# Patient Record
Sex: MALE | Race: WHITE | Employment: FULL TIME | URBAN - METROPOLITAN AREA
[De-identification: names, ages, dates, MRNs, and addresses within clinical notes are randomized per-mention and may not be internally consistent; named-entity substitution may affect disease eponyms.]

---

## 2020-03-04 ENCOUNTER — APPOINTMENT (OUTPATIENT)
Dept: ULTRASOUND IMAGING | Facility: CLINIC | Age: 60
End: 2020-03-04
Attending: INTERNAL MEDICINE
Payer: COMMERCIAL

## 2020-03-04 ENCOUNTER — APPOINTMENT (OUTPATIENT)
Dept: INTERVENTIONAL RADIOLOGY/VASCULAR | Facility: CLINIC | Age: 60
End: 2020-03-04
Attending: NURSE PRACTITIONER
Payer: COMMERCIAL

## 2020-03-04 ENCOUNTER — HOSPITAL ENCOUNTER (INPATIENT)
Facility: CLINIC | Age: 60
LOS: 2 days | Discharge: HOME | End: 2020-03-06
Attending: INTERNAL MEDICINE | Admitting: INTERNAL MEDICINE
Payer: COMMERCIAL

## 2020-03-04 ENCOUNTER — APPOINTMENT (OUTPATIENT)
Dept: CT IMAGING | Facility: CLINIC | Age: 60
End: 2020-03-04
Attending: EMERGENCY MEDICINE
Payer: COMMERCIAL

## 2020-03-04 ENCOUNTER — HOSPITAL ENCOUNTER (EMERGENCY)
Facility: CLINIC | Age: 60
Discharge: ANOTHER HOSPITAL | End: 2020-03-04
Attending: EMERGENCY MEDICINE | Admitting: EMERGENCY MEDICINE
Payer: COMMERCIAL

## 2020-03-04 VITALS
WEIGHT: 225 LBS | TEMPERATURE: 97.5 F | HEART RATE: 82 BPM | HEIGHT: 71 IN | DIASTOLIC BLOOD PRESSURE: 82 MMHG | BODY MASS INDEX: 31.5 KG/M2 | OXYGEN SATURATION: 94 % | RESPIRATION RATE: 24 BRPM | SYSTOLIC BLOOD PRESSURE: 129 MMHG

## 2020-03-04 DIAGNOSIS — I26.02 ACUTE SADDLE PULMONARY EMBOLISM WITH ACUTE COR PULMONALE (H): ICD-10-CM

## 2020-03-04 DIAGNOSIS — I26.02 ACUTE SADDLE PULMONARY EMBOLISM WITH ACUTE COR PULMONALE (H): Primary | ICD-10-CM

## 2020-03-04 PROBLEM — I26.99 PULMONARY EMBOLISM (H): Status: ACTIVE | Noted: 2020-03-04

## 2020-03-04 LAB
ANION GAP SERPL CALCULATED.3IONS-SCNC: 6 MMOL/L (ref 3–14)
BUN SERPL-MCNC: 15 MG/DL (ref 7–30)
CALCIUM SERPL-MCNC: 8.6 MG/DL (ref 8.5–10.1)
CHLORIDE SERPL-SCNC: 106 MMOL/L (ref 94–109)
CO2 SERPL-SCNC: 25 MMOL/L (ref 20–32)
CREAT SERPL-MCNC: 0.95 MG/DL (ref 0.66–1.25)
D DIMER PPP FEU-MCNC: 5 UG/ML FEU (ref 0–0.5)
ERYTHROCYTE [DISTWIDTH] IN BLOOD BY AUTOMATED COUNT: 12.9 % (ref 10–15)
ERYTHROCYTE [DISTWIDTH] IN BLOOD BY AUTOMATED COUNT: 13 % (ref 10–15)
GFR SERPL CREATININE-BSD FRML MDRD: 86 ML/MIN/{1.73_M2}
GLUCOSE BLDC GLUCOMTR-MCNC: 128 MG/DL (ref 70–99)
GLUCOSE BLDC GLUCOMTR-MCNC: 153 MG/DL (ref 70–99)
GLUCOSE BLDC GLUCOMTR-MCNC: 176 MG/DL (ref 70–99)
GLUCOSE BLDC GLUCOMTR-MCNC: 178 MG/DL (ref 70–99)
GLUCOSE SERPL-MCNC: 262 MG/DL (ref 70–99)
HBA1C MFR BLD: 8.9 % (ref 0–5.6)
HCT VFR BLD AUTO: 41 % (ref 40–53)
HCT VFR BLD AUTO: 44.1 % (ref 40–53)
HGB BLD-MCNC: 14 G/DL (ref 13.3–17.7)
HGB BLD-MCNC: 14.7 G/DL (ref 13.3–17.7)
INR PPP: 1.17 (ref 0.86–1.14)
INTERPRETATION ECG - MUSE: NORMAL
INTERPRETATION ECG - MUSE: NORMAL
KCT BLD-ACNC: 247 SEC (ref 75–150)
LMWH PPP CHRO-ACNC: 1 IU/ML
MCH RBC QN AUTO: 29.4 PG (ref 26.5–33)
MCH RBC QN AUTO: 29.5 PG (ref 26.5–33)
MCHC RBC AUTO-ENTMCNC: 33.3 G/DL (ref 31.5–36.5)
MCHC RBC AUTO-ENTMCNC: 34.1 G/DL (ref 31.5–36.5)
MCV RBC AUTO: 87 FL (ref 78–100)
MCV RBC AUTO: 88 FL (ref 78–100)
MRSA DNA SPEC QL NAA+PROBE: NEGATIVE
NT-PROBNP SERPL-MCNC: 595 PG/ML (ref 0–900)
PLATELET # BLD AUTO: 63 10E9/L (ref 150–450)
PLATELET # BLD AUTO: 67 10E9/L (ref 150–450)
POTASSIUM SERPL-SCNC: 3.8 MMOL/L (ref 3.4–5.3)
RADIOLOGIST FLAGS: ABNORMAL
RBC # BLD AUTO: 4.74 10E12/L (ref 4.4–5.9)
RBC # BLD AUTO: 5 10E12/L (ref 4.4–5.9)
SODIUM SERPL-SCNC: 136 MMOL/L (ref 133–144)
SPECIMEN SOURCE: NORMAL
TROPONIN I SERPL-MCNC: 0.12 UG/L (ref 0–0.04)
TROPONIN I SERPL-MCNC: 0.13 UG/L (ref 0–0.04)
WBC # BLD AUTO: 8.4 10E9/L (ref 4–11)
WBC # BLD AUTO: 9.9 10E9/L (ref 4–11)

## 2020-03-04 PROCEDURE — 99223 1ST HOSP IP/OBS HIGH 75: CPT | Performed by: INTERNAL MEDICINE

## 2020-03-04 PROCEDURE — 25500064 ZZH RX 255 OP 636: Performed by: RADIOLOGY

## 2020-03-04 PROCEDURE — 25000131 ZZH RX MED GY IP 250 OP 636 PS 637: Performed by: NURSE PRACTITIONER

## 2020-03-04 PROCEDURE — 27210742 ZZH CATH CR1

## 2020-03-04 PROCEDURE — C1769 GUIDE WIRE: HCPCS

## 2020-03-04 PROCEDURE — 71260 CT THORAX DX C+: CPT

## 2020-03-04 PROCEDURE — 83036 HEMOGLOBIN GLYCOSYLATED A1C: CPT | Performed by: NURSE PRACTITIONER

## 2020-03-04 PROCEDURE — 27210808 ZZH SHEATH CR7

## 2020-03-04 PROCEDURE — 83880 ASSAY OF NATRIURETIC PEPTIDE: CPT | Performed by: INTERNAL MEDICINE

## 2020-03-04 PROCEDURE — 85379 FIBRIN DEGRADATION QUANT: CPT | Performed by: RADIOLOGY

## 2020-03-04 PROCEDURE — 25000128 H RX IP 250 OP 636: Performed by: EMERGENCY MEDICINE

## 2020-03-04 PROCEDURE — 27210740 ZZH ACCESSORY CR3

## 2020-03-04 PROCEDURE — 96365 THER/PROPH/DIAG IV INF INIT: CPT

## 2020-03-04 PROCEDURE — 99223 1ST HOSP IP/OBS HIGH 75: CPT | Mod: AI | Performed by: NURSE PRACTITIONER

## 2020-03-04 PROCEDURE — 20000003 ZZH R&B ICU

## 2020-03-04 PROCEDURE — 25000128 H RX IP 250 OP 636: Performed by: INTERNAL MEDICINE

## 2020-03-04 PROCEDURE — 96375 TX/PRO/DX INJ NEW DRUG ADDON: CPT

## 2020-03-04 PROCEDURE — 36015 PLACE CATHETER IN ARTERY: CPT

## 2020-03-04 PROCEDURE — 00000146 ZZHCL STATISTIC GLUCOSE BY METER IP

## 2020-03-04 PROCEDURE — 25000125 ZZHC RX 250: Performed by: PHYSICIAN ASSISTANT

## 2020-03-04 PROCEDURE — 87640 STAPH A DNA AMP PROBE: CPT | Performed by: NURSE PRACTITIONER

## 2020-03-04 PROCEDURE — 85260 CLOT FACTOR X STUART-POWER: CPT | Performed by: INTERNAL MEDICINE

## 2020-03-04 PROCEDURE — 93005 ELECTROCARDIOGRAM TRACING: CPT | Mod: 76

## 2020-03-04 PROCEDURE — 85520 HEPARIN ASSAY: CPT | Performed by: INTERNAL MEDICINE

## 2020-03-04 PROCEDURE — 93971 EXTREMITY STUDY: CPT

## 2020-03-04 PROCEDURE — 25800030 ZZH RX IP 258 OP 636: Performed by: RADIOLOGY

## 2020-03-04 PROCEDURE — 27210804 ZZH SHEATH CR3

## 2020-03-04 PROCEDURE — 02CQ3ZZ EXTIRPATION OF MATTER FROM RIGHT PULMONARY ARTERY, PERCUTANEOUS APPROACH: ICD-10-PCS | Performed by: RADIOLOGY

## 2020-03-04 PROCEDURE — 99207 ZZC CDG-CRITICAL CARE TIME NOT DOCUMENTED: CPT | Performed by: NURSE PRACTITIONER

## 2020-03-04 PROCEDURE — 80048 BASIC METABOLIC PNL TOTAL CA: CPT | Performed by: EMERGENCY MEDICINE

## 2020-03-04 PROCEDURE — 36415 COLL VENOUS BLD VENIPUNCTURE: CPT | Performed by: RADIOLOGY

## 2020-03-04 PROCEDURE — 27211192 ZZ H SHEATH CR14

## 2020-03-04 PROCEDURE — 85347 COAGULATION TIME ACTIVATED: CPT

## 2020-03-04 PROCEDURE — 36415 COLL VENOUS BLD VENIPUNCTURE: CPT | Performed by: INTERNAL MEDICINE

## 2020-03-04 PROCEDURE — 27210806 ZZH SHEATH CR5

## 2020-03-04 PROCEDURE — 99285 EMERGENCY DEPT VISIT HI MDM: CPT | Mod: 25

## 2020-03-04 PROCEDURE — 36014 PLACE CATHETER IN ARTERY: CPT

## 2020-03-04 PROCEDURE — 85027 COMPLETE CBC AUTOMATED: CPT | Performed by: NURSE PRACTITIONER

## 2020-03-04 PROCEDURE — 25000128 H RX IP 250 OP 636: Performed by: PHYSICIAN ASSISTANT

## 2020-03-04 PROCEDURE — C1757 CATH, THROMBECTOMY/EMBOLECT: HCPCS

## 2020-03-04 PROCEDURE — 87641 MR-STAPH DNA AMP PROBE: CPT | Performed by: NURSE PRACTITIONER

## 2020-03-04 PROCEDURE — 02CR3ZZ EXTIRPATION OF MATTER FROM LEFT PULMONARY ARTERY, PERCUTANEOUS APPROACH: ICD-10-PCS | Performed by: RADIOLOGY

## 2020-03-04 PROCEDURE — 27210886 ZZH ACCESSORY CR5

## 2020-03-04 PROCEDURE — 25000128 H RX IP 250 OP 636: Performed by: RADIOLOGY

## 2020-03-04 PROCEDURE — 85610 PROTHROMBIN TIME: CPT | Performed by: RADIOLOGY

## 2020-03-04 PROCEDURE — 96361 HYDRATE IV INFUSION ADD-ON: CPT | Mod: 59

## 2020-03-04 PROCEDURE — 93005 ELECTROCARDIOGRAM TRACING: CPT

## 2020-03-04 PROCEDURE — 84484 ASSAY OF TROPONIN QUANT: CPT | Performed by: EMERGENCY MEDICINE

## 2020-03-04 PROCEDURE — 25800030 ZZH RX IP 258 OP 636: Performed by: EMERGENCY MEDICINE

## 2020-03-04 PROCEDURE — 85027 COMPLETE CBC AUTOMATED: CPT | Performed by: EMERGENCY MEDICINE

## 2020-03-04 PROCEDURE — 36415 COLL VENOUS BLD VENIPUNCTURE: CPT | Performed by: NURSE PRACTITIONER

## 2020-03-04 RX ORDER — NICOTINE POLACRILEX 4 MG
15-30 LOZENGE BUCCAL
Status: DISCONTINUED | OUTPATIENT
Start: 2020-03-04 | End: 2020-03-06 | Stop reason: HOSPADM

## 2020-03-04 RX ORDER — LIDOCAINE HYDROCHLORIDE 10 MG/ML
INJECTION, SOLUTION INFILTRATION; PERINEURAL
Status: DISCONTINUED
Start: 2020-03-04 | End: 2020-03-04 | Stop reason: HOSPADM

## 2020-03-04 RX ORDER — ESCITALOPRAM OXALATE 10 MG/1
10 TABLET ORAL DAILY
Status: ON HOLD | COMMUNITY
End: 2020-03-04

## 2020-03-04 RX ORDER — POTASSIUM CHLORIDE 29.8 MG/ML
20 INJECTION INTRAVENOUS
Status: DISCONTINUED | OUTPATIENT
Start: 2020-03-04 | End: 2020-03-06 | Stop reason: HOSPADM

## 2020-03-04 RX ORDER — PROCHLORPERAZINE MALEATE 5 MG
10 TABLET ORAL EVERY 6 HOURS PRN
Status: DISCONTINUED | OUTPATIENT
Start: 2020-03-04 | End: 2020-03-06 | Stop reason: HOSPADM

## 2020-03-04 RX ORDER — ATORVASTATIN CALCIUM 20 MG/1
20 TABLET, FILM COATED ORAL DAILY
Status: ON HOLD | COMMUNITY
End: 2020-03-04

## 2020-03-04 RX ORDER — GLIPIZIDE 10 MG/1
10 TABLET, FILM COATED, EXTENDED RELEASE ORAL DAILY
COMMUNITY

## 2020-03-04 RX ORDER — ACETAMINOPHEN 500 MG
500 TABLET ORAL EVERY 6 HOURS PRN
Status: DISCONTINUED | OUTPATIENT
Start: 2020-03-04 | End: 2020-03-06 | Stop reason: HOSPADM

## 2020-03-04 RX ORDER — NALOXONE HYDROCHLORIDE 0.4 MG/ML
.1-.4 INJECTION, SOLUTION INTRAMUSCULAR; INTRAVENOUS; SUBCUTANEOUS
Status: DISCONTINUED | OUTPATIENT
Start: 2020-03-04 | End: 2020-03-04 | Stop reason: HOSPADM

## 2020-03-04 RX ORDER — MAGNESIUM SULFATE HEPTAHYDRATE 40 MG/ML
2 INJECTION, SOLUTION INTRAVENOUS DAILY PRN
Status: DISCONTINUED | OUTPATIENT
Start: 2020-03-04 | End: 2020-03-06 | Stop reason: HOSPADM

## 2020-03-04 RX ORDER — POTASSIUM CL/LIDO/0.9 % NACL 10MEQ/0.1L
10 INTRAVENOUS SOLUTION, PIGGYBACK (ML) INTRAVENOUS
Status: DISCONTINUED | OUTPATIENT
Start: 2020-03-04 | End: 2020-03-06 | Stop reason: HOSPADM

## 2020-03-04 RX ORDER — ASPIRIN 81 MG/1
81 TABLET ORAL DAILY
Status: ON HOLD | COMMUNITY
End: 2020-03-06

## 2020-03-04 RX ORDER — FENTANYL CITRATE 50 UG/ML
25-50 INJECTION, SOLUTION INTRAMUSCULAR; INTRAVENOUS EVERY 5 MIN PRN
Status: DISCONTINUED | OUTPATIENT
Start: 2020-03-04 | End: 2020-03-04 | Stop reason: HOSPADM

## 2020-03-04 RX ORDER — HEPARIN SODIUM 1000 [USP'U]/ML
500-6000 INJECTION, SOLUTION INTRAVENOUS; SUBCUTANEOUS
Status: DISCONTINUED | OUTPATIENT
Start: 2020-03-04 | End: 2020-03-04 | Stop reason: HOSPADM

## 2020-03-04 RX ORDER — IOPAMIDOL 755 MG/ML
80 INJECTION, SOLUTION INTRAVASCULAR ONCE
Status: COMPLETED | OUTPATIENT
Start: 2020-03-04 | End: 2020-03-04

## 2020-03-04 RX ORDER — CODEINE PHOSPHATE AND GUAIFENESIN 10; 100 MG/5ML; MG/5ML
1 SOLUTION ORAL EVERY 4 HOURS PRN
Status: ON HOLD | COMMUNITY
End: 2020-03-04

## 2020-03-04 RX ORDER — POLYETHYLENE GLYCOL 3350 17 G/17G
17 POWDER, FOR SOLUTION ORAL DAILY PRN
Status: DISCONTINUED | OUTPATIENT
Start: 2020-03-04 | End: 2020-03-06 | Stop reason: HOSPADM

## 2020-03-04 RX ORDER — ALBUTEROL SULFATE 0.83 MG/ML
2.5 SOLUTION RESPIRATORY (INHALATION) EVERY 6 HOURS PRN
COMMUNITY

## 2020-03-04 RX ORDER — BENZONATATE 150 MG/1
200 CAPSULE ORAL 3 TIMES DAILY PRN
Status: ON HOLD | COMMUNITY
End: 2020-03-04

## 2020-03-04 RX ORDER — NALOXONE HYDROCHLORIDE 0.4 MG/ML
.1-.4 INJECTION, SOLUTION INTRAMUSCULAR; INTRAVENOUS; SUBCUTANEOUS
Status: DISCONTINUED | OUTPATIENT
Start: 2020-03-04 | End: 2020-03-06 | Stop reason: HOSPADM

## 2020-03-04 RX ORDER — FENTANYL CITRATE 50 UG/ML
INJECTION, SOLUTION INTRAMUSCULAR; INTRAVENOUS
Status: DISCONTINUED
Start: 2020-03-04 | End: 2020-03-04 | Stop reason: HOSPADM

## 2020-03-04 RX ORDER — METFORMIN HCL 500 MG
1000 TABLET, EXTENDED RELEASE 24 HR ORAL
COMMUNITY

## 2020-03-04 RX ORDER — DEXTROSE MONOHYDRATE 25 G/50ML
25-50 INJECTION, SOLUTION INTRAVENOUS
Status: DISCONTINUED | OUTPATIENT
Start: 2020-03-04 | End: 2020-03-04

## 2020-03-04 RX ORDER — FLUMAZENIL 0.1 MG/ML
0.2 INJECTION, SOLUTION INTRAVENOUS
Status: DISCONTINUED | OUTPATIENT
Start: 2020-03-04 | End: 2020-03-04 | Stop reason: HOSPADM

## 2020-03-04 RX ORDER — NICOTINE POLACRILEX 4 MG
15-30 LOZENGE BUCCAL
Status: DISCONTINUED | OUTPATIENT
Start: 2020-03-04 | End: 2020-03-04

## 2020-03-04 RX ORDER — POTASSIUM CHLORIDE 7.45 MG/ML
10 INJECTION INTRAVENOUS
Status: DISCONTINUED | OUTPATIENT
Start: 2020-03-04 | End: 2020-03-06 | Stop reason: HOSPADM

## 2020-03-04 RX ORDER — HEPARIN SODIUM 10000 [USP'U]/100ML
1550 INJECTION, SOLUTION INTRAVENOUS CONTINUOUS
Status: DISCONTINUED | OUTPATIENT
Start: 2020-03-04 | End: 2020-03-04 | Stop reason: HOSPADM

## 2020-03-04 RX ORDER — ONDANSETRON 2 MG/ML
4 INJECTION INTRAMUSCULAR; INTRAVENOUS EVERY 6 HOURS PRN
Status: DISCONTINUED | OUTPATIENT
Start: 2020-03-04 | End: 2020-03-06 | Stop reason: HOSPADM

## 2020-03-04 RX ORDER — POTASSIUM CHLORIDE 1.5 G/1.58G
20-40 POWDER, FOR SOLUTION ORAL
Status: DISCONTINUED | OUTPATIENT
Start: 2020-03-04 | End: 2020-03-06 | Stop reason: HOSPADM

## 2020-03-04 RX ORDER — MAGNESIUM SULFATE HEPTAHYDRATE 40 MG/ML
4 INJECTION, SOLUTION INTRAVENOUS EVERY 4 HOURS PRN
Status: DISCONTINUED | OUTPATIENT
Start: 2020-03-04 | End: 2020-03-06 | Stop reason: HOSPADM

## 2020-03-04 RX ORDER — SODIUM CHLORIDE 9 MG/ML
INJECTION, SOLUTION INTRAVENOUS CONTINUOUS
Status: DISCONTINUED | OUTPATIENT
Start: 2020-03-04 | End: 2020-03-05

## 2020-03-04 RX ORDER — ONDANSETRON 4 MG/1
4 TABLET, ORALLY DISINTEGRATING ORAL EVERY 6 HOURS PRN
Status: DISCONTINUED | OUTPATIENT
Start: 2020-03-04 | End: 2020-03-06 | Stop reason: HOSPADM

## 2020-03-04 RX ORDER — SODIUM CHLORIDE 9 MG/ML
1000 INJECTION, SOLUTION INTRAVENOUS CONTINUOUS
Status: DISCONTINUED | OUTPATIENT
Start: 2020-03-04 | End: 2020-03-04 | Stop reason: HOSPADM

## 2020-03-04 RX ORDER — LISINOPRIL 10 MG/1
10 TABLET ORAL DAILY
Status: ON HOLD | COMMUNITY
End: 2020-03-04

## 2020-03-04 RX ORDER — DEXTROSE MONOHYDRATE 25 G/50ML
25-50 INJECTION, SOLUTION INTRAVENOUS
Status: DISCONTINUED | OUTPATIENT
Start: 2020-03-04 | End: 2020-03-06 | Stop reason: HOSPADM

## 2020-03-04 RX ORDER — PROCHLORPERAZINE 25 MG
25 SUPPOSITORY, RECTAL RECTAL EVERY 12 HOURS PRN
Status: DISCONTINUED | OUTPATIENT
Start: 2020-03-04 | End: 2020-03-06 | Stop reason: HOSPADM

## 2020-03-04 RX ORDER — HYDROMORPHONE HYDROCHLORIDE 1 MG/ML
0.2 INJECTION, SOLUTION INTRAMUSCULAR; INTRAVENOUS; SUBCUTANEOUS
Status: DISCONTINUED | OUTPATIENT
Start: 2020-03-04 | End: 2020-03-06 | Stop reason: HOSPADM

## 2020-03-04 RX ORDER — IODIXANOL 320 MG/ML
150 INJECTION, SOLUTION INTRAVASCULAR ONCE
Status: COMPLETED | OUTPATIENT
Start: 2020-03-04 | End: 2020-03-04

## 2020-03-04 RX ORDER — HEPARIN SODIUM 10000 [USP'U]/100ML
1450 INJECTION, SOLUTION INTRAVENOUS CONTINUOUS
Status: DISCONTINUED | OUTPATIENT
Start: 2020-03-04 | End: 2020-03-06

## 2020-03-04 RX ORDER — POTASSIUM CHLORIDE 1500 MG/1
20-40 TABLET, EXTENDED RELEASE ORAL
Status: DISCONTINUED | OUTPATIENT
Start: 2020-03-04 | End: 2020-03-06 | Stop reason: HOSPADM

## 2020-03-04 RX ORDER — FLUTICASONE PROPIONATE 50 MCG
1 SPRAY, SUSPENSION (ML) NASAL DAILY PRN
COMMUNITY

## 2020-03-04 RX ADMIN — SODIUM CHLORIDE 1000 ML: 9 INJECTION, SOLUTION INTRAVENOUS at 08:11

## 2020-03-04 RX ADMIN — MIDAZOLAM HYDROCHLORIDE 1 MG: 1 INJECTION, SOLUTION INTRAMUSCULAR; INTRAVENOUS at 14:43

## 2020-03-04 RX ADMIN — Medication 6900 UNITS: at 09:18

## 2020-03-04 RX ADMIN — HEPARIN SODIUM 10000 UNITS: 10000 INJECTION INTRAVENOUS; SUBCUTANEOUS at 14:46

## 2020-03-04 RX ADMIN — FENTANYL CITRATE 50 MCG: 50 INJECTION, SOLUTION INTRAMUSCULAR; INTRAVENOUS at 14:43

## 2020-03-04 RX ADMIN — INSULIN ASPART 1 UNITS: 100 INJECTION, SOLUTION INTRAVENOUS; SUBCUTANEOUS at 12:49

## 2020-03-04 RX ADMIN — FENTANYL CITRATE 25 MCG: 50 INJECTION, SOLUTION INTRAMUSCULAR; INTRAVENOUS at 15:18

## 2020-03-04 RX ADMIN — SODIUM CHLORIDE: 9 INJECTION, SOLUTION INTRAVENOUS at 15:30

## 2020-03-04 RX ADMIN — MIDAZOLAM HYDROCHLORIDE 0.5 MG: 1 INJECTION, SOLUTION INTRAMUSCULAR; INTRAVENOUS at 15:55

## 2020-03-04 RX ADMIN — HEPARIN SODIUM 1550 UNITS/HR: 10000 INJECTION, SOLUTION INTRAVENOUS at 09:17

## 2020-03-04 RX ADMIN — MIDAZOLAM HYDROCHLORIDE 0.5 MG: 1 INJECTION, SOLUTION INTRAMUSCULAR; INTRAVENOUS at 16:07

## 2020-03-04 RX ADMIN — FENTANYL CITRATE 25 MCG: 50 INJECTION, SOLUTION INTRAMUSCULAR; INTRAVENOUS at 15:55

## 2020-03-04 RX ADMIN — FENTANYL CITRATE 50 MCG: 50 INJECTION, SOLUTION INTRAMUSCULAR; INTRAVENOUS at 14:59

## 2020-03-04 RX ADMIN — LIDOCAINE HYDROCHLORIDE 5 ML: 10 INJECTION, SOLUTION INFILTRATION; PERINEURAL at 16:42

## 2020-03-04 RX ADMIN — IODIXANOL 140 ML: 320 INJECTION, SOLUTION INTRAVASCULAR at 16:45

## 2020-03-04 RX ADMIN — MIDAZOLAM HYDROCHLORIDE 1 MG: 1 INJECTION, SOLUTION INTRAMUSCULAR; INTRAVENOUS at 16:24

## 2020-03-04 RX ADMIN — HEPARIN SODIUM 1550 UNITS/HR: 10000 INJECTION, SOLUTION INTRAVENOUS at 19:07

## 2020-03-04 RX ADMIN — Medication 2000 UNITS: at 15:32

## 2020-03-04 RX ADMIN — IOPAMIDOL 80 ML: 755 INJECTION, SOLUTION INTRAVENOUS at 08:20

## 2020-03-04 RX ADMIN — FENTANYL CITRATE 25 MCG: 50 INJECTION, SOLUTION INTRAMUSCULAR; INTRAVENOUS at 16:07

## 2020-03-04 RX ADMIN — MIDAZOLAM HYDROCHLORIDE 1 MG: 1 INJECTION, SOLUTION INTRAMUSCULAR; INTRAVENOUS at 14:59

## 2020-03-04 RX ADMIN — FENTANYL CITRATE 25 MCG: 50 INJECTION, SOLUTION INTRAMUSCULAR; INTRAVENOUS at 16:23

## 2020-03-04 RX ADMIN — MIDAZOLAM HYDROCHLORIDE 0.5 MG: 1 INJECTION, SOLUTION INTRAMUSCULAR; INTRAVENOUS at 15:19

## 2020-03-04 ASSESSMENT — ENCOUNTER SYMPTOMS
DIAPHORESIS: 1
SHORTNESS OF BREATH: 1
ABDOMINAL PAIN: 0
COUGH: 1

## 2020-03-04 ASSESSMENT — MIFFLIN-ST. JEOR: SCORE: 1852.72

## 2020-03-04 ASSESSMENT — ACTIVITIES OF DAILY LIVING (ADL)
ADLS_ACUITY_SCORE: 11
ADLS_ACUITY_SCORE: 11

## 2020-03-04 NOTE — ED PROVIDER NOTES
"  History     Chief Complaint:  Chest Pain      HPI   Joseph Kent is a 59 year old male who presents with his wife for evaluation of chest pain. Patient reports picking up a 250 lbs  around 9pm last night and felt midsternal chest pain immediately. Patient states since then he has been unable to take a deep breath as he instantly begins coughing. His pain is currently at a 2/10 but increases to a 8/10 with exertion in conjunction with shortness of breath and diaphoresis. Patient tried to go to work today but his pain exacerbated, prompting him to present to the ED. He denies taking any pain meds. He denies abdominal pain.    Allergies:  Escitalopram    Medications:    Lisinopril  Atorvastatin  Glipizide  Flonase  Metformin  Albuterol  Benzonatate  Robitussin AC    Past Medical History:    Hyperlipidemia  HTN  GERD  GALI  DM type 2  Obesity  Calculus of kidney  Hyperparathyroidism  CKD  Kidney stones    Past Surgical History:    Vasectomy  Appendectomy  Partial thyroidectomy    Family History:    History reviewed. No pertinent family history.     Social History:  The patient presents to the emergency department with his wife  Marital Status:      Review of Systems   Constitutional: Positive for diaphoresis.   Respiratory: Positive for cough and shortness of breath.    Cardiovascular: Positive for chest pain.   Gastrointestinal: Negative for abdominal pain.   All other systems reviewed and are negative.    Physical Exam     Patient Vitals for the past 24 hrs:   BP Temp Temp src Pulse Heart Rate Resp SpO2 Height Weight   03/04/20 0907 -- -- -- -- -- -- -- -- 102.1 kg (225 lb)   03/04/20 0845 125/82 -- -- -- -- -- -- -- --   03/04/20 0815 -- -- -- -- 86 14 94 % -- --   03/04/20 0800 115/81 -- -- 89 89 20 95 % -- --   03/04/20 0745 135/84 -- -- 89 87 27 94 % -- --   03/04/20 0730 104/78 -- -- 89 90 17 93 % -- --   03/04/20 0719 (!) 140/82 97.5  F (36.4  C) Oral 92 -- 14 90 % 1.803 m (5' 11\") " --       Physical Exam  Constitutional: Patient is well appearing. No distress. Head: Atraumatic.  Mouth/Throat: Oropharynx is clear and moist. No oropharyngeal exudate.  Eyes: Conjunctivae and EOM are normal. No scleral icterus.  Neck: Normal range of motion. Neck supple.   Cardiovascular: Normal rate, regular rhythm, normal heart sounds and intact distal pulses.   Pulmonary/Chest: Breath sounds normal. No respiratory distress. Localizes pain to left border of sternum at about the pec level.  Abdominal: Soft. Bowel sounds are normal. No distension. No tenderness. No rebound or guarding.   Musculoskeletal: Normal range of motion. No edema or tenderness. Neurological: Alert and orientated to person, place, and time. No observable focal neuro deficit  Skin: Warm and dry. No rash noted. Not diaphoretic.     Emergency Department Course   ECG (7:17:42):  Rate 92 bpm. AR interval 172. QRS duration 84. QT/QTc 334/413. P-R-T axes 59 47 62. Normal sinus rhythm. Nonspecific ST and T wave abnormality. Abnormal ECG. Interpreted at 730 by Reilly Crocker MD.    ECG (9:17:11):  Rate 83 bpm. AR interval 178. QRS duration 78. QT/QTc 400/470. P-R-T axes 64 42 39. Normal sinus rhythm. Low voltage QRS. Borderline ECG. Interpreted at 920 by Reilly Crocker MD.    Imaging:  Radiographic findings were communicated with the patient who voiced understanding of the findings.  CT Chest with contrast:   IMPRESSION:   1.  Saddle pulmonary embolism with embolic burden crossing the midline  and leading to all lobes of both lungs. There is also evidence for  right heart strain with flattening and mild bowing of the  interventricular septum. Recommend close clinical assessment.  2.  No acute airspace disease.  3.  A few indeterminate pulmonary nodules, see below for follow up.  4.  Indeterminate enhancing nodule at the posterior right liver.  Recommend nonurgent liver MRI for further assessment. as per radiology.    Laboratory:  CBC: WBC:  8.4, HGB: 14.7, PLT: 63 (L)  BMP: Glucose 262 (H), o/w WNL (Creatinine: 0.95)  726 Troponin: 0.133 (HH)    Interventions:  811 NS 1000 mL IV  917 heparin 1550 units/hr IV  918 heparin 6900 units IV    Emergency Department Course:  Nursing notes and vitals reviewed. (943) I performed an exam of the patient as documented above.     IV inserted. Medicine administered as documented above. Blood drawn. This was sent to the lab for further testing, results above.     The patient was sent for a CT while in the emergency department, findings above.     An EKG was recorded. Results as noted above.     839 I consulted Dr. Cat of radiology and discussed the results of the CT scan    842 I rechecked the patient and discussed the results of his workup thus far.     853 I consulted BRUNO Tello, of IR and dicussed the patient    854  I consulted with Dr. Kunz of the hospitalist services at [facility].  She is in agreement to accept the patient for transfer.    Findings and plan explained to the Patient who consents to transfer. Discussed the patient with Dr. Kunz, who will admit the patient to [facility] for further monitoring, evaluation, and treatment.     Impression & Plan    Medical Decision Making:  Joseph Kent is a 59 year old male who presents for evaluation of chest pain.  The workup in the Emergency Department indicates this is due to an acute saddle pulmonary embolism.  This was discussed with the patient.  Heparin anticoagulation was initiated and likely to cath lab with IR guided TPA.  I spoke with Dr. Kunz and IR at [facility] who agreed to accept transfer of the patient for cath lab.     Diagnosis:    ICD-10-CM    1. Acute saddle pulmonary embolism with acute cor pulmonale (H) I26.02        Disposition:  Transfer to [facility]  Anand Slater  3/4/2020   [facility] EMERGENCY DEPARTMENT  Scribe Disclosure:  IAnand, am serving as a scribe at 7:43 AM on  3/4/2020 to document services personally performed by Reilly Crocker MD based on my observations and the provider's statements to me.        Reilly Crocker MD  05/13/20 0007

## 2020-03-04 NOTE — PRE-PROCEDURE
GENERAL PRE-PROCEDURE:   Procedure:  Pulmonary angiogram with mechanical thrombectomy with moderate sedation  Date/Time:  3/4/2020 12:03 PM    Written consent obtained?: Yes    Risks and benefits: Risks, benefits and alternatives were discussed    Consent given by:  Patient  Patient states understanding of procedure being performed: Yes    Patient's understanding of procedure matches consent: Yes    Procedure consent matches procedure scheduled: Yes    Expected level of sedation:  Moderate  Appropriately NPO:  Yes  ASA Class:  Class 2- mild systemic disease, no acute problems, no functional limitations  Mallampati  :  Grade 1- soft palate, uvula, tonsillar pillars, and posterior pharyngeal wall visible  Lungs:  Lungs clear with good breath sounds bilaterally  Heart:  Normal heart sounds and rate  History & Physical reviewed:  History and physical reviewed and no updates needed  Statement of review:  I have reviewed the lab findings, diagnostic data, medications, and the plan for sedation    
no decreased eating/drinking/no cough/no vomiting/no rash/no diarrhea

## 2020-03-04 NOTE — SEDATION DOCUMENTATION
Interventional Radiology Intra-procedural Nursing Note    Patient Name: Joseph Kent  Medical Record Number: [id number]  Today's Date: March 4, 2020    Start Time: 1449  End of procedure time: 1646  Procedure: Romel  Report given to: NILAM Lizarraga on ICU  Time pt departs:    :     Other Notes: Pt transferred to IR table. Prepped and draped appropriately. Monitoring equipment applied. NC applied. No complaints of pain at this time. Timeout recorded.  4mg versed, 200mcg fentanyl. 2,000units iv heparin  Right groin (venous) site CDI, soft. Has suture in place, covered with tegaderm and gauze. Hemostasis at 1650. No c/o pain at this time. Pt remains on RA at this time. VSS. No c/o pain at this time.  Transported to the ICU with flying squad.     KARINA MORGAN RN

## 2020-03-04 NOTE — PHARMACY-ADMISSION MEDICATION HISTORY
Pharmacy Medication History  Admission medication history interview status for the 3/4/2020  admission is complete. See EPIC admission navigator for prior to admission medications     Medication history sources: Patient & family  Medication history source reliability: Good  Adherence assessment: Good    Significant changes made to the medication list:  Removed Augmentin, lipitor, lexapro, robitussin, lisinopril.  Changed doses of Metformin, Zantac.      Additional medication history information:   None    Medication reconciliation completed by provider prior to medication history? No    Time spent in this activity: 20 min      Prior to Admission medications    Medication Sig Last Dose Taking? Auth Provider   albuterol (PROVENTIL) (2.5 MG/3ML) 0.083% neb solution Take 2.5 mg by nebulization every 6 hours as needed for shortness of breath / dyspnea or wheezing  Yes Unknown, Entered By History   aspirin 81 MG EC tablet Take 81 mg by mouth daily 3/4/2020 at Unknown time Yes Unknown, Entered By History   fluticasone (FLONASE) 50 MCG/ACT nasal spray Spray 1 spray into both nostrils daily as needed   Yes Unknown, Entered By History   glipiZIDE (GLUCOTROL XL) 10 MG 24 hr tablet Take 10 mg by mouth daily 3/4/2020 at Unknown time Yes Unknown, Entered By History   metFORMIN (GLUCOPHAGE-XR) 500 MG 24 hr tablet Take 1,000 mg by mouth daily (with dinner)  3/4/2020 at Unknown time Yes Unknown, Entered By History   ranitidine (ZANTAC) 150 MG tablet Take 150 mg by mouth every other day  3/4/2020 at Unknown time Yes Unknown, Entered By History

## 2020-03-04 NOTE — CONSULTS
Consult Date:  03/04/2020      VASCULAR MEDICINE CONSULTATION       PRIMARY CARE PHYSICIAN:  Dr. Uyen Armas.       REQUESTING PHYSICIAN:  Hospitalist system.      REASON FOR CONSULTATION:      Evaluation and management of first lifetime VTE presenting as unprovoked saddle pulmonary embolus with right heart strain in an individual who is adopted and is unaware of his family history and who is simultaneously up-to-date on age-appropriate cancer screening (colonoscopy 2 years ago with adenomatous polyps but no cancer; normal recent PSA).     At present, the patient has a saddle pulmonary embolus with evidence of right heart strain.  He is currently hemodynamically stable with a blood pressure of 125/77 and a heart rate of 80.  His CT angiogram of the chest reveals saddle pulmonary embolus with bowing of the interatrial septum.  At present, I recommend the following:     a)  Proceed to lower extremity venous duplex immediately.     b)  Thrombophilia evaluation not presently warranted as this is the patient's first lifetime VTE.  While it is true that the anatomical degree of presentation is markedly out of proportion to provocative factors, the likelihood that he will have a nameable thrombophilia that will alter management either in terms of the duration or choice of anticoagulation is astronomically low.  The one exception to this situation is the possibility of antiphospholipid antibodies.  If the patient has antiphospholipid antibodies, he should not be anticoagulated with a NOAC.  He is presently on heparin.  As such, we cannot directly tested for phospholipid antibodies.  However, we can check an INR and a chromogenic factor X to ascertain if they are discordant,  If they are discordant, it is likely that the patient does have phospholipid antibodies.  In that scenario, I would check for phospholipid antibodies in 6 weeks with him having been off of all heparin products.  In the interim, I would  anticoagulate the patient with Lovenox bridging to warfarin anticoagulation, and I would monitor anticoagulation via chromogenic factor X levels.  I would not recommend presently undertaking any additional thrombophilia testing.  As delineated above, the likelihood of finding a namable thrombophilia, which would alter duration or choice of anticoagulation other than phospholipid antibodies is low.      c)  Since the patient is up-to-date on all cancer screening, I would not recommend further cancer screening.  Of note, the most likely etiology of this is his obesity and suboptimally glycemically controlled type 2 diabetes (see information pertaining to that below), with or without additional thrombophilia, but which would, again, not alter management    d) Proceed to pulmonary artery thrombectomy after venous duplex has been undertaken.     Suboptimally glycemically controlled type 2 diabetes:     The patient is presently on glipizide and metformin as an outpatient.  He is suboptimally glycemically controlled with a hemoglobin A1c of 8.9%.  For the time being, he is on medium insulin resistant sliding scale NovoLog and it is noted that his glucose has decreased from 262 to 153.  At present, I recommend the following:   a)  Maintain on sliding scale insulin over the course of the next 24 hours.   b)  Hold metformin as the patient will receive contrast.   c)  As an outpatient, consider institution of Jardiance or Victoza to reduce cardiovascular mortality.      CHIEF COMPLAINT:  Pleuritic chest pain.      HISTORY OF PRESENT ILLNESS:  The patient is a 59-year-old white male with a lifetime history of not smoking who is who is obese with a body mass index of 32.  He is a suboptimally glycemically controlled type 2 diabetic.  He developed 8/10 pleuritic chest pain suddenly with diaphoresis at 9:00 p.m. on 03/03/2020 when lifting a heavy object.  He presented to [facility] ER.  PE protocol CT angiogram revealed saddle  pulmonary embolus without embolic burden crossing the midline and leading to all lobes of both lungs.  He had interatrial bowing of his septum on CT angiography suggestive of right heart strain.  Biomarkers other than troponin have not been ordered.  Heparin drip was initiated.  Our input was sought regarding the above.      REVIEW OF SYSTEMS:  Shortness of breath, diaphoresis and pleuritic chest pain, currently resolved while on a heparin drip.  He denies continued chest pain, nausea, vomiting, diarrhea or shortness of breath.  The remainder of his 14-point review of systems is within normal limits.      PREVIOUS MEDICAL HISTORY:   1.  Hypertension.   2.  Type 2 diabetes.   3.  Hyperlipidemia.   4.  Gastroesophageal reflux disease.   5.  Anxiety.   6.  Allergic rhinitis.       PAST SURGICAL HISTORY:  None.      FAMILY HISTORY:  Unknown as he is adopted.      SOCIAL HISTORY:  The patient is a lifetime nonsmoker.  He is .  He is employed as a .  He engages in hobbies involving use of model trains.  He is not physically active.      MEDICATIONS PRIOR TO ADMISSION:   1.  Albuterol nebulizers q. 6 hours p.r.n.   2.  Augmentin p.o. b.i.d.   3.  Aspirin 81 mg daily.   4.  Lipitor 20 mg daily.   5.  Tessalon Perles p.r.n.   6.  Lexapro 10 mg daily.     7.  Flonase 1 spray both nostrils.   8.  Glipizide 10 mg daily.   9.  Robitussin-AC p.r.n.   10.  Zestril 10 mg daily.   11.  Metformin 2000 mg daily.   12.  Zantac 150 mg p.o. b.i.d.      ALLERGIES:  NO KNOWN DRUG ALLERGIES.      PHYSICAL EXAMINATION:   GENERAL:  The patient is awake, alert and oriented.   VITAL SIGNS:  Blood pressure is 126/84 presently, had been 87 as well as 87/62, heart rate is 81 and regular, temperature is 98.8 degrees Fahrenheit, pulse ox is 95% on room air.   HEENT:  Oropharynx within normal limits.   NECK:  No JVD, thyromegaly, lymphadenopathy.   LUNGS:  Clear to auscultation bilaterally without rales, wheezes or rhonchi.   HEART:   Regular rate and rhythm, normal S1, S2, no S3, S4, murmur, gallop or rub.   ABDOMEN:  Normoactive bowel sounds, obese, soft, nondistended, nontender.   EXTREMITIES:  Without cyanosis, clubbing or edema.  The legs bilaterally are not tense, tight or taut.  He has 3+ femoral, popliteal, DP and PT pulses.  He does have evidence of onychomycosis.   NEUROLOGIC:  Nonfocal.   DERMATOLOGIC:  No suspicious lumps or masses.   HEMATOLOGIC:  Reveals no lymphadenopathy.      LABORATORY DATA:  Hemoglobin A1c is 8.9%.  Troponin is 0.133, followed by 0.12.  D-dimer is currently pending.  CBC with platelets is normal.  Basic metabolic panel is normal other than a glucose of 262.  PE protocol CT of the chest reveals saddle pulmonary embolus with evidence of right heart strain as evidenced by interatrial bowing.        ASSESSMENT AND PLAN:  Please see the above.         ASAF GAY MD             D: 2020   T: 2020   MT:       Name:     MIGUEL CASEY   MRN:      [id number]        Account:       [id number]   :      1960           Consult Date:  2020      Document: [id number]       cc: Uyen Armas MD

## 2020-03-04 NOTE — PROCEDURES
[facility]    Procedure: Pulmonary angiogram and mechanical thrombectomy  Date/Time: 3/4/2020 5:08 PM  Performed by: Angel Reese MD  Authorized by: Angel Reese MD     UNIVERSAL PROTOCOL   Site Marked: NA  Prior Images Obtained and Reviewed:  Yes  Required items: Required blood products, implants, devices and special equipment available    Patient identity confirmed:  Verbally with patient, arm band, provided demographic data and hospital-assigned identification number  Patient was reevaluated immediately before administering moderate or deep sedation or anesthesia  Confirmation Checklist:  Patient's identity using two indicators, relevant allergies, procedure was appropriate and matched the consent or emergent situation and correct equipment/implants were available  Time out: Immediately prior to the procedure a time out was called    Universal Protocol: the Joint Commission Universal Protocol was followed    Preparation: Patient was prepped and draped in usual sterile fashion           ANESTHESIA    Anesthesia: Local infiltration  Local Anesthetic:  Lidocaine 1% without epinephrine      SEDATION    Patient Sedated: Yes    Vital signs: Vital signs monitored during sedation    See dictated procedure note for full details.  Findings: Right Common Femoral Vein.  Pulmonary angiogram.  Mechanical Thrombectomy of the pulmonary arteries.  Good result at completion with removal of thrombus.    Main Pulmonary Arterial pressures to begin:  34/11 with MAP of 20.  After intervention, Main PA pressure:  21/5 MAP of 13.    Pursestring suture at venous access site of the left groin.  Heparin drip continued during the exam, and to be continued after the exam.  ACT was drawn toward beginning of procedure, and was 247 at 1522.  2000 U bolus of heparin administered at that time.    Specimens: none    Complications: None    Condition: Stable    PROCEDURE   Patient Tolerance:  Patient tolerated  the procedure well with no immediate complications    Length of time physician/provider present for 1:1 monitoring during sedation: 135

## 2020-03-04 NOTE — H&P
[facility]    History and Physical  Hospitalist       Date of Admission:  3/4/2020    Assessment & Plan   Joseph Kent is a 59 year old male who presented to [facility] ED on 3/4/2020 for evaluation and management of 8/10 pleuritic chest pain with diaphoresis with onset around 2100 3/3/2020 when lifting a heavy object. CT PE study revealed saddle pulmonary embolism with embolic burden crossing the midline and leading to all lobes of both lungs with right heart strain with flattening and mild bowing of the interventricular septum. Heparin drip was initiated. Dr. Lugo was consulted with recommendation for transfer to [facility] for pulmonary embolectomy. Mr. Kent is currently admitted to [facility] ICU.     Pulmonary embolism, saddle, unclear etiology   Right heart strain secondary to saddle PE  Mr. Kent endorsed sudden onset severe chest pain last evening associated with inability to take a deep breath and diaphoresis. He is adopted therefore not aware of family history of clotting disorders. He denies prior clots and denies recent travel. On exam no obvious sign of DVT but he does have venous appearing skin changes to right lower extremity. He is not tachycardic, not hypoxic, and has no obvious signs of shock. He states he had a colonoscopy.   - admit to ICU  - consult to IR for consideration of embolectomy   - continue Heparin  - consult to Vascular Medicine for recommendation of: 1. Long-term anticoagulation and duration of therapy and 2. Consideration of hypercoagulation work-up     Thrombocytopenia, likely secondary to PE  Platelet count 63 with lab drawn prior to Heparin exposure. No obvious signs of spontaneous bleeding. IR staff has been made aware and will let Dr. Lugo know.   - continue to monitor   - repeat CBC upon arrival to [facility]     Pulmonary nodules, indeterminate  Indeterminate enhancing nodule of liver, posterior right  Incidental findings on CT as above with recommendation  for non-urgent liver MRI and surveillance CT for lung nodules in 6- 12 months, then 18- 24 months if no change.  - continue to monitor  - likely refer to outpatient provider     Type II diabetes mellitus, treated  Chronic. Last hemoglobin A1c 9.0 9/19/2019.   - will check hemoglobin A1c  - hold home metformin 1000- mg by mouth twice daily  - BGM AC/HS  - correction scale insulin and hypoglycemia protocol available   - when able to eat consistent CHO diet      DVT Prophylaxis: on Heparin  Code Status: Full Code    Disposition: pending    The above assessment and plan has been discussed with Dr. Kunz, who is in agreement with the above assessment and plan.     Swathi Edwards, CAROLANN, CNP  Hospitalist Service, House Officer  [facility]     Text Page  Pager: [phone]    Primary Care Physician   Dr. Uyen Armas    Chief Complaint   Chest pain    History is obtained from the patient.    History of Present Illness   Joseph Kent is a 59 year old male who presented to [facility] ED on 3/4/2020 for evaluation and management of 8/10 pleuritic chest pain with diaphoresis with onset around 2100 3/3/2020 when lifting a heavy object. He endorsed inability to take a deep breath and worsening of chest pain with exertion. He attempted to go to work, but proceeded to [facility] ED when his pain worsened with activity. At this time he offers no specific acute complaints and denies shortness of breath and chest pain at rest.     Emergency Department course included labs which revealed platelet count 63. CT PE revealed saddle pulmonary embolism with embolic burden crossing the midline and leading to all lobes of both lungs with evidence of right heart strain with flattening and mild bowing of the interventricular septum, no airspace disease, few indeterminate pulmonary nodules, and indeterminate enhancing nodule at the posterior right liver. Heparin drip was initiated. Dr. Lugo was consulted with  recommendation for transfer to [facility] for consideration of embolectomy. He is currently admitted to ICU.     Past Medical History    I personally reviewed history with patient:  - type II DM  - overactive thyroid     Past Surgical History   I personally reviewed history with patient:  - partial thyroidectomy   - appendectomy     Prior to Admission Medications   None     Allergies   No Known Allergies    Social History    I personally reviewed history with patient:  - lives in [place], [place], with his family  - lifelong non-smoker  - denies abuse of alcohol and drugs  - works as a   - makes his own medical and legal decisions     Family History   I personally reviewed history with patient:  - Mr. Kent is adopted and has no knowledge of birth family history.     Review of Systems   The 10 point Review of Systems is negative other than noted in the HPI or here.     Physical Exam                      Vital Signs with Ranges  Temp:  [97.5  F (36.4  C)] 97.5  F (36.4  C)  Pulse:  [82-92] 82  Heart Rate:  [82-90] 89  Resp:  [14-27] 24  BP: (104-140)/(77-85) 129/82  SpO2:  [90 %-95 %] 94 %  0 lbs 0 oz    General: Appears stated age, no acute distress.  Skin:  Warm, dry. No rashes or lesions on exposed skin.  HEENT:  Normocephalic, atraumatic.  Chest:  Bilateral anterior and posterior lung fields clear to auscultation. No increased work of breathing. Does not require supplemental oxygen.  Cardiovascular:  Regular rate and rhythm, without murmur, rub, or gallop. Bilateral upper and lower distal pulses palpable.   Abdomen:  Soft, non-tender, non-distended. Bowel sounds present.   Musculoskeletal:  Moves all four extremities.   Neurological:  Alert and oriented x 4. Cranial nerves II-XII grossly intact.   Psychiatric:  Affect and mood congruent.    Data   Data reviewed today:  I personally reviewed the chest CT image(s) showing saddle PE.    Recent Labs   Lab 03/04/20  0941 03/04/20  0812 03/04/20  0726   WBC  --   8.4  --    HGB  --  14.7  --    MCV  --  88  --    PLT  --  63*  --    NA  --   --  136   POTASSIUM  --   --  3.8   CHLORIDE  --   --  106   CO2  --   --  25   BUN  --   --  15   CR  --   --  0.95   ANIONGAP  --   --  6   TIFFANIE  --   --  8.6   GLC  --   --  262*   TROPI 0.120*  --  0.133*       Imaging:  Recent Results (from the past 24 hour(s))   CT Chest w Contrast   Result Value    Radiologist flags Pulmonary embolism (AA)    Narrative    CT CHEST WITH CONTRAST  3/4/2020 8:30 AM    CLINICAL HISTORY: Mid chest pain after heavy lifting last night.    TECHNIQUE: CT chest with IV contrast. Multiplanar reformats were  obtained. Dose reduction techniques were used.    CONTRAST: 80 mL Isovue-370    COMPARISON: None.    FINDINGS:   LUNGS AND PLEURA: No acute airspace disease. No effusions. Two small  nodules posterior right lower lobe with an example measuring 0.3 cm,  series 4 image 157. Right lower lobe 0.6 cm nodule, image 201. There  are other nodules also identified bilaterally.    MEDIASTINUM/AXILLAE: No acute thoracic aortic abnormality. No thoracic  aortic dissection. Demonstration of a saddle pulmonary embolism with  thrombus leading to all lobes of both lungs, and crossing the midline  at the pulmonary artery bifurcation, series 2 image 25. There is  evidence for flattening of the interventricular septum, series 2 image  40. This may also even be mildly bowed towards the left ventricle.    UPPER ABDOMEN: No acute abnormality. 0.7 cm nodular enhancement at the  posterior right hepatic dome, series 2 image 45.    MUSCULOSKELETAL: No acute abnormality. Degenerative disc disease  changes.      Impression    IMPRESSION:   1.  Saddle pulmonary embolism with embolic burden crossing the midline  and leading to all lobes of both lungs. There is also evidence for  right heart strain with flattening and mild bowing of the  interventricular septum. Recommend close clinical assessment.  2.  No acute airspace disease.  3.  A  few indeterminate pulmonary nodules, see below for follow up.  4.  Indeterminate enhancing nodule at the posterior right liver.  Recommend nonurgent liver MRI for further assessment.    [Critical Result: Pulmonary embolism]    Finding was identified on 3/4/2020 8:32 AM.     Dr. Crocker was contacted by me on 3/4/2020 8:39 AM and verbalized  understanding of the critical result.     Recommendations for an incidental lung nodule = or > 6mm to 8mm:    Low risk patients: Initial follow-up CT at 6-12 months, then  consider CT at 18-24 months if no change.    High risk patients: Initial follow-up CT at 6-12 months, then CT at  18-24 months if no change.    *Low Risk: Minimal or absent history of smoking or other known risk  factors.  *Nonsolid (ground-glass) or partly solid nodules may require longer  follow-up to exclude indolent adenocarcinoma.  *Recommendations based on Guidelines for the Management of Incidental  Pulmonary Nodules Detected at CT: From the Fleischner Society 2017,  Radiology 2017.

## 2020-03-04 NOTE — ED TRIAGE NOTES
Picked up a heavy object at 2100, had immediate chest pain , unable to take a deep breath due to cough , nothing has relieved pain , no pain meds taken , rates pain a 2 currently , but with walking up stairs had a pain 8/10 with severe SOB and diaphoresis. Wife with patient , tried to go to work today but pain intensified .

## 2020-03-04 NOTE — PROGRESS NOTES
Pt arrived from [facility]. [facility]. Alert and oriented. On room air. Reporting no SOB or chest pain.

## 2020-03-04 NOTE — LETTER
[facility]  [address] [place] [address]  [phone]          March 6, 2020    RE:  Joseph Kent                                                                                                                                                       [id number]  [facility] [address]            To whom it may concern:    Joseph Kent was under my care from 3/4/2020 - 3/6/2020 for management of blood clots in his leg and lung.  He is medically cleared to return to all work duties at [employer] beginning 3/9/2020.  Please do not hesitate to contact me if you have questions or concerns.       Sincerely,          Monik Kunz, DO  Internal Medicine - Hospitalist  [facility]  3/6/2020

## 2020-03-04 NOTE — PROGRESS NOTES
Interventional Radiology Consult Note:  Inpatient at [facility]   Date: 2020   Patient name: Joseph Kent  MRN:[id number]  :  1960    Reason for Consult: saddle pulmonary embolism  Requesting Provider: transferred from [facility] ER;  CAROLANN Paredes, CNP admitting hospitalist    HPI: Joseph Kent is a 59 year old old male who developed chest pain and diaphoresis late last evening when lifting a heavy object. Since then he has had some shortness of breath and cough, he thought might be bronchitis. His pain was significant enough to prompt a visit to [facility] ER where a CT scan showed saddle pulmonary embolism with evidence of right heart strain. Images and patient presentation were reviewed with Dr Lugo and patient was transferred to [facility] where he has been admitted to the ICU. Vascular medicine has been consulted.    NPO Status: Patient has been NPO greater than 4 hours  Anticoagulation/Antiplatelets/Bleeding tendencies: Heparin drip started on admission, no preadmission anticoagulation  Antibiotics: n/a    ROS: A comprehensive 10-point review of systems was performed. All systems were reviewed and negative with exception to those reported in the HPI.     PAST MEDICAL HISTORY:  Past Medical History:   Diagnosis Date     Diabetes (H)      Gastroesophageal reflux disease      Hypertension        PAST SURGICAL HISTORY:  No past surgical history on file.    ALLERGIES:  No Known Allergies    MEDICATIONS:  Current Facility-Administered Medications   Medication     glucose gel 15-30 g    Or     dextrose 50 % injection 25-50 mL    Or     glucagon injection 1 mg     heparin 25,000 units in 0.45% NaCl 250 mL ANTICOAGULANT  infusion     HYDROmorphone (PF) (DILAUDID) injection 0.2 mg     insulin aspart (NovoLOG) injection (RAPID ACTING)     insulin aspart (NovoLOG) injection (RAPID ACTING)     magnesium sulfate 2 g in water  intermittent infusion     magnesium sulfate 4 g in 100 mL sterile water (premade)     naloxone (NARCAN) injection 0.1-0.4 mg     ondansetron (ZOFRAN-ODT) ODT tab 4 mg    Or     ondansetron (ZOFRAN) injection 4 mg     Patient is already receiving anticoagulation with heparin, enoxaparin (LOVENOX), warfarin (COUMADIN)  or other anticoagulant medication     polyethylene glycol (MIRALAX) Packet 17 g     potassium chloride (KLOR-CON) Packet 20-40 mEq     potassium chloride 10 mEq in 100 mL intermittent infusion with 10 mg lidocaine     potassium chloride 10 mEq in 100 mL sterile water intermittent infusion (premix)     potassium chloride 20 mEq in 50 mL intermittent infusion     potassium chloride ER (KLOR-CON M) CR tablet 20-40 mEq     prochlorperazine (COMPAZINE) injection 10 mg    Or     prochlorperazine (COMPAZINE) tablet 10 mg    Or     prochlorperazine (COMPAZINE) Suppository 25 mg       LABS:  INR (no units)   Date Value   03/04/2020 1.17 (H)     Hemoglobin (g/dL)   Date Value   03/04/2020 14.0     No components found for: PLTS  No results found for: CREATININE  No components found for: K    IMAGING:  Recent Results (from the past 24 hour(s))   CT Chest w Contrast   Result Value    Radiologist flags Pulmonary embolism (AA)    Narrative    CT CHEST WITH CONTRAST  3/4/2020 8:30 AM    CLINICAL HISTORY: Mid chest pain after heavy lifting last night.    TECHNIQUE: CT chest with IV contrast. Multiplanar reformats were  obtained. Dose reduction techniques were used.    CONTRAST: 80 mL Isovue-370    COMPARISON: None.    FINDINGS:   LUNGS AND PLEURA: No acute airspace disease. No effusions. Two small  nodules posterior right lower lobe with an example measuring 0.3 cm,  series 4 image 157. Right lower lobe 0.6 cm nodule, image 201. There  are other nodules also identified bilaterally.    MEDIASTINUM/AXILLAE: No acute thoracic aortic abnormality. No thoracic  aortic dissection. Demonstration of a saddle pulmonary embolism  with  thrombus leading to all lobes of both lungs, and crossing the midline  at the pulmonary artery bifurcation, series 2 image 25. There is  evidence for flattening of the interventricular septum, series 2 image  40. This may also even be mildly bowed towards the left ventricle.    UPPER ABDOMEN: No acute abnormality. 0.7 cm nodular enhancement at the  posterior right hepatic dome, series 2 image 45.    MUSCULOSKELETAL: No acute abnormality. Degenerative disc disease  changes.      Impression    IMPRESSION:   1.  Saddle pulmonary embolism with embolic burden crossing the midline  and leading to all lobes of both lungs. There is also evidence for  right heart strain with flattening and mild bowing of the  interventricular septum. Recommend close clinical assessment.  2.  No acute airspace disease.  3.  A few indeterminate pulmonary nodules, see below for follow up.  4.  Indeterminate enhancing nodule at the posterior right liver.  Recommend nonurgent liver MRI for further assessment.    [Critical Result: Pulmonary embolism]    Finding was identified on 3/4/2020 8:32 AM.     Dr. Crocker was contacted by me on 3/4/2020 8:39 AM and verbalized  understanding of the critical result.     Recommendations for an incidental lung nodule = or > 6mm to 8mm:    Low risk patients: Initial follow-up CT at 6-12 months, then  consider CT at 18-24 months if no change.    High risk patients: Initial follow-up CT at 6-12 months, then CT at  18-24 months if no change.    *Low Risk: Minimal or absent history of smoking or other known risk  factors.  *Nonsolid (ground-glass) or partly solid nodules may require longer  follow-up to exclude indolent adenocarcinoma.  *Recommendations based on Guidelines for the Management of Incidental  Pulmonary Nodules Detected at CT: From the Fleischner Society 2017,  Radiology 2017.    KATHRYN FAROOQ MD         PHYSICAL EXAM:   /89 (BP Location: Left arm)   Pulse 84   Temp 98.8  F (37.1  C)  (Axillary)   Wt 104.7 kg (230 lb 13.2 oz)   SpO2 93%   BMI 32.19 kg/m    General: Stable. In no acute distress.  Neuro: A&O x 3. Moves all extremities equally.  Resp: Lungs clear to auscultation bilaterally.  Cardio: RRR.  Abdomen: Soft, non-distended, non-tender, positive bowel sounds.  Vascular: +2/4 bilateral femoral pulses, +2/4 bilateral dorsalis pedis pulses, +2/4 bilateral posterior tibial pulses.  Skin: Without excoriations, ecchymosis, erythema, lesions or open sores.  MSK: No gross motor weakness. Sensation intact. Proprioception intact.    ASSESSMENT: saddle pulmonary embolism with right heart strain    PLAN: Recommend pulmonary angiogram with mechanical thrombectomy due to right heart strain. Procedure to be performed this afternoon. After reviewing the procedure, risks and benefits, the patient verbalized understanding and would like to proceed. Consent was signed and is in the IR department. Sedation assessment documented separately. Appreciate vascular medicine input regarding coagulopathy workup and duration of anticoagulation therapy.     Thank you for kindly for this consultation.    40 minutes were spent on this consultation of which more than 50% of the time was spent face to face with the patient, in reviewing medical record and images and in counseling and coordinating patient's care.    Elisha Fuentes PA-C  Interventional Radiology

## 2020-03-04 NOTE — PLAN OF CARE
PT/OT: Pt with saddle PE with heparin drip currently, transferred this morning to [facility] from [facility] for pulmonary embolectomy. Will hold PT today for medical management.

## 2020-03-05 ENCOUNTER — APPOINTMENT (OUTPATIENT)
Dept: PHYSICAL THERAPY | Facility: CLINIC | Age: 60
End: 2020-03-05
Attending: NURSE PRACTITIONER
Payer: COMMERCIAL

## 2020-03-05 DIAGNOSIS — I82.409 DVT (DEEP VENOUS THROMBOSIS) (H): Primary | ICD-10-CM

## 2020-03-05 LAB
ANION GAP SERPL CALCULATED.3IONS-SCNC: 4 MMOL/L (ref 3–14)
BUN SERPL-MCNC: 12 MG/DL (ref 7–30)
CALCIUM SERPL-MCNC: 7.6 MG/DL (ref 8.5–10.1)
CHLORIDE SERPL-SCNC: 108 MMOL/L (ref 94–109)
CO2 SERPL-SCNC: 24 MMOL/L (ref 20–32)
CREAT SERPL-MCNC: 0.86 MG/DL (ref 0.66–1.25)
ERYTHROCYTE [DISTWIDTH] IN BLOOD BY AUTOMATED COUNT: 13 % (ref 10–15)
FACT X ACT/NOR PPP CHRO: 88 % (ref 70–130)
GFR SERPL CREATININE-BSD FRML MDRD: >90 ML/MIN/{1.73_M2}
GLUCOSE BLDC GLUCOMTR-MCNC: 156 MG/DL (ref 70–99)
GLUCOSE BLDC GLUCOMTR-MCNC: 166 MG/DL (ref 70–99)
GLUCOSE BLDC GLUCOMTR-MCNC: 167 MG/DL (ref 70–99)
GLUCOSE BLDC GLUCOMTR-MCNC: 170 MG/DL (ref 70–99)
GLUCOSE SERPL-MCNC: 175 MG/DL (ref 70–99)
HCT VFR BLD AUTO: 41.2 % (ref 40–53)
HGB BLD-MCNC: 13.6 G/DL (ref 13.3–17.7)
LMWH PPP CHRO-ACNC: 0.54 IU/ML
MAGNESIUM SERPL-MCNC: 1.8 MG/DL (ref 1.6–2.3)
MCH RBC QN AUTO: 28.8 PG (ref 26.5–33)
MCHC RBC AUTO-ENTMCNC: 33 G/DL (ref 31.5–36.5)
MCV RBC AUTO: 87 FL (ref 78–100)
PLATELET # BLD AUTO: 61 10E9/L (ref 150–450)
POTASSIUM SERPL-SCNC: 3.6 MMOL/L (ref 3.4–5.3)
POTASSIUM SERPL-SCNC: 3.8 MMOL/L (ref 3.4–5.3)
RBC # BLD AUTO: 4.73 10E12/L (ref 4.4–5.9)
SODIUM SERPL-SCNC: 136 MMOL/L (ref 133–144)
WBC # BLD AUTO: 9.3 10E9/L (ref 4–11)

## 2020-03-05 PROCEDURE — 99207 ZZC NON-BILLABLE SERV PER CHARTING: CPT | Performed by: INTERNAL MEDICINE

## 2020-03-05 PROCEDURE — 97161 PT EVAL LOW COMPLEX 20 MIN: CPT | Mod: GP | Performed by: PHYSICAL THERAPIST

## 2020-03-05 PROCEDURE — 80048 BASIC METABOLIC PNL TOTAL CA: CPT | Performed by: NURSE PRACTITIONER

## 2020-03-05 PROCEDURE — 99232 SBSQ HOSP IP/OBS MODERATE 35: CPT | Performed by: INTERNAL MEDICINE

## 2020-03-05 PROCEDURE — 84132 ASSAY OF SERUM POTASSIUM: CPT | Performed by: INTERNAL MEDICINE

## 2020-03-05 PROCEDURE — 85520 HEPARIN ASSAY: CPT | Performed by: INTERNAL MEDICINE

## 2020-03-05 PROCEDURE — 36415 COLL VENOUS BLD VENIPUNCTURE: CPT | Performed by: INTERNAL MEDICINE

## 2020-03-05 PROCEDURE — 99233 SBSQ HOSP IP/OBS HIGH 50: CPT | Performed by: INTERNAL MEDICINE

## 2020-03-05 PROCEDURE — 00000146 ZZHCL STATISTIC GLUCOSE BY METER IP

## 2020-03-05 PROCEDURE — 85027 COMPLETE CBC AUTOMATED: CPT | Performed by: NURSE PRACTITIONER

## 2020-03-05 PROCEDURE — 83735 ASSAY OF MAGNESIUM: CPT | Performed by: INTERNAL MEDICINE

## 2020-03-05 PROCEDURE — 25000128 H RX IP 250 OP 636: Performed by: INTERNAL MEDICINE

## 2020-03-05 PROCEDURE — 25800030 ZZH RX IP 258 OP 636: Performed by: RADIOLOGY

## 2020-03-05 PROCEDURE — 25000128 H RX IP 250 OP 636: Performed by: NURSE PRACTITIONER

## 2020-03-05 PROCEDURE — 25000132 ZZH RX MED GY IP 250 OP 250 PS 637: Performed by: NURSE PRACTITIONER

## 2020-03-05 PROCEDURE — 36415 COLL VENOUS BLD VENIPUNCTURE: CPT | Performed by: NURSE PRACTITIONER

## 2020-03-05 PROCEDURE — 12000047 ZZH R&B IMCU

## 2020-03-05 RX ORDER — DEXTROSE MONOHYDRATE 25 G/50ML
25-50 INJECTION, SOLUTION INTRAVENOUS
Status: DISCONTINUED | OUTPATIENT
Start: 2020-03-05 | End: 2020-03-05

## 2020-03-05 RX ORDER — NICOTINE POLACRILEX 4 MG
15-30 LOZENGE BUCCAL
Status: DISCONTINUED | OUTPATIENT
Start: 2020-03-05 | End: 2020-03-05

## 2020-03-05 RX ADMIN — HEPARIN SODIUM 1450 UNITS/HR: 10000 INJECTION, SOLUTION INTRAVENOUS at 11:59

## 2020-03-05 RX ADMIN — POTASSIUM CHLORIDE 20 MEQ: 1500 TABLET, EXTENDED RELEASE ORAL at 11:10

## 2020-03-05 RX ADMIN — SODIUM CHLORIDE: 9 INJECTION, SOLUTION INTRAVENOUS at 07:49

## 2020-03-05 RX ADMIN — INSULIN ASPART 1 UNITS: 100 INJECTION, SOLUTION INTRAVENOUS; SUBCUTANEOUS at 08:23

## 2020-03-05 RX ADMIN — MAGNESIUM SULFATE HEPTAHYDRATE 2 G: 40 INJECTION, SOLUTION INTRAVENOUS at 11:10

## 2020-03-05 RX ADMIN — SODIUM CHLORIDE: 9 INJECTION, SOLUTION INTRAVENOUS at 00:59

## 2020-03-05 ASSESSMENT — ACTIVITIES OF DAILY LIVING (ADL)
ADLS_ACUITY_SCORE: 11
ADLS_ACUITY_SCORE: 13
ADLS_ACUITY_SCORE: 11
ADLS_ACUITY_SCORE: 11
ADLS_ACUITY_SCORE: 13
ADLS_ACUITY_SCORE: 13

## 2020-03-05 NOTE — PLAN OF CARE
Discharge Planner OT   Patient plan for discharge: Home  Current status: Orders received, chart reviewed, spoke with patiet. Pt is a 59 year old male who admitted with pulmonary embolism, now s/p pulmonary angiogram and mechanical thrombectomy. At baseline pt is independent in ADLs/IADLs and lives in house with spouse. Per PT note, today pt is independent in ambulation with good dynamic standing balance. Spoke with pt and he reports no concerns with returning home and completing ADLs and IADLs. Encouraged pt to ambulate at least 4 times today with staff. Pt in agreement with no OT services indicated.  Barriers to return to prior living situation: none per function  Recommendations for discharge: Home   Rationale for recommendations: No skilled therapy needs, orders will be completed.

## 2020-03-05 NOTE — PLAN OF CARE
A&Ox4. VSS. Tele NSR. RA. 2L NC while napping, de-sating to 88%. R groin tender with ecchymosis. Up SBA. Voiding.

## 2020-03-05 NOTE — PROGRESS NOTES
03/05/20 1000   Quick Adds   Type of Visit Initial PT Evaluation   Living Environment   Lives With spouse   Living Arrangements house   Home Accessibility stairs to enter home;stairs within home   Number of Stairs, Main Entrance 2   Stair Railings, Main Entrance railing on left side (ascending)   Number of Stairs, Within Home, Primary 7  (split level, 3 separate flights of 6-7 stairs)   Stair Railings, Within Home, Primary railing on left side (ascending)   Transportation Anticipated car, drives self   Self-Care   Usual Activity Tolerance good   Current Activity Tolerance good   Equipment Currently Used at Home none   Activity/Exercise/Self-Care Comment Pt works as a  and part time at [employer]   Functional Level Prior   Ambulation 0-->independent   Transferring 0-->independent   Fall history within last six months no   Which of the above functional risks had a recent onset or change? none   Prior Functional Level Comment Pt is independent with mobility and cares at baseline   General Information   Onset of Illness/Injury or Date of Surgery - Date 03/04/20   Referring Physician Astrid Kunz, DO   Patient/Family Goals Statement to go home   Pertinent History of Current Problem (include personal factors and/or comorbidities that impact the POC)  Pt is a 59 year old male who admitted with pulmonary embolism, now s/p pulmonary angiogram and mechanical thrombectomy.   Cognitive Status Examination   Orientation orientation to person, place and time   Level of Consciousness alert   Follows Commands and Answers Questions 100% of the time   Personal Safety and Judgment intact   Memory intact   Range of Motion (ROM)   ROM Quick Adds No deficits were identified   Strength   Manual Muscle Testing Quick Adds No deficits were identified   Bed Mobility   Bed Mobility Comments Min A for supine to sit, seems to be due to lines connected to pt   Transfer Skills   Transfer Comments IND with sit to/from stand and  "stand pivot to chair   Gait   Gait Comments Ambulates 150 feet with no AD. Starting with CGA progressing to IND, no significant gait abnomalities or LOB   Balance   Balance Comments Good seated balance, standing: feet together, tandem, standing marching with no LOB or additional assist   Sensory Examination   Sensory Perception Comments B feet intact   Clinical Impression   Criteria for Skilled Therapeutic Intervention evaluation only   Clinical Presentation Stable/Uncomplicated   Clinical Decision Making (Complexity) Low complexity   Anticipated Discharge Disposition Home   Risk & Benefits of therapy have been explained Yes   Patient, Family & other staff in agreement with plan of care Yes   [facility] TM \"6 Clicks\"   2016, Trustees of [facility], under license to [employer].  All rights reserved.   6 Clicks Short Forms Basic Mobility Inpatient Short Form   [facility] AM-PAC  \"6 Clicks\" V.2 Basic Mobility Inpatient Short Form   1. Turning from your back to your side while in a flat bed without using bedrails? 4 - None   2. Moving from lying on your back to sitting on the side of a flat bed without using bedrails? 4 - None   3. Moving to and from a bed to a chair (including a wheelchair)? 4 - None   4. Standing up from a chair using your arms (e.g., wheelchair, or bedside chair)? 4 - None   5. To walk in hospital room? 4 - None   6. Climbing 3-5 steps with a railing? 4 - None   Basic Mobility Raw Score (Score out of 24.Lower scores equate to lower levels of function) 24   Total Evaluation Time   Total Evaluation Time (Minutes) 20     "

## 2020-03-05 NOTE — PLAN OF CARE
Discharge Planner PT   Patient plan for discharge: Home  Current status: Orders received, chart reviewed. Eval completed. Pt is a 59 year old male who admitted with pulmonary embolism, now s/p pulmonary angiogram and mechanical thrombectomy. Pt is independent at baseline, lives in house with spouse.     Pt is supine to sit with min A though seems to be due to management of lines connected to patient. Pt is independent with sit to/from stand. Ambulation with no assistive device, begins with CGA but progresses to independent, no significant gait abnormalities or LOB. Additional assist for tele and IV pole management during ambulation. Demonstrates good dynamic standing balance. Pt maintains stable O2 sats with mobility on RA. Baseline BP: 112/65, following walk BP: 122/72    Barriers to return to prior living situation: none per mobility  Recommendations for discharge: Home   Rationale for recommendations: Pt is demonstrates meeting goals of safe mobility for returning home. Independent with mobility, no skilled therapy needs, orders will be completed.        Entered by: She Hawley 03/05/2020 10:44 AM

## 2020-03-05 NOTE — PLAN OF CARE
Patient A&O, VSS on RA. Has some soreness in R groin site, site soft and no drainage. Pulses present in all extremities. Tolerating regular diet. Using urinal at bedside w/ adequate output. Heparin gtt. Nursing will continue to monitor.

## 2020-03-05 NOTE — PROGRESS NOTES
[facility]  Vascular Medicine Progress Note          Assessment and Plan:   Active Problems:      First lifetime VTE presenting as unprovoked RLE PTV DVT w/ saddle pulmonary embolism and right heart strain with successful pulmonary artery suction thrombectomy 3/4/20 without need for IVC filter placement based upon degree of thrombus retrieval and infrageniculate remaining RLE DVT.    Assessment:     -Doing well from HD and respiratory perspective S/P successful large volume PA suction thrombectomy.  -No filter needed as above.  -Rt groin purse string suture removal later today.   -Doing well on IV UFH gtt.    Plan:     -No thrombophilia eval warranted as this is the patient's first VTE event.   -He is up to date on cancer screening.  -CFX correlates with INR, thus making APLA positivity highly unlikely. Pt could therefore use NOAC eventually.  -Transfer out of ICU to station sliding scale stepdown, remain on IV UFH gtt 24 additional hours.     Type 2 Diabetes      Assessment:     -Not at AC goal on metformin, glipizide as outpt.  -ACs suboptimal on medium IR sliding scale cvg.    Plan:     -Hold metformin until tomorrow due to contrast yesterday.  -Low consistent CHO diet, increase sliding scale cvg to high IR.  -Initiate Jardiance as an outpt to minimize CV mortality, events.                 Interval History:   doing well; no cp, sob, n/v/d, or abd pain.              Review of Systems:   The 10 point Review of Systems is negative other than noted in the HPI             Medications:       insulin aspart  1-7 Units Subcutaneous TID AC     insulin aspart  1-5 Units Subcutaneous At Bedtime     sodium chloride (PF)  3 mL Intracatheter Q8H                  Physical Exam:     Patient Vitals for the past 24 hrs:   BP Temp Temp src Pulse Heart Rate Resp SpO2 Weight   03/05/20 0907 124/72 -- -- 88 86 16 94 % --   03/05/20 0800 123/70 97.6  F (36.4  C) Oral 84 85 20 96 % --   03/05/20 0726 124/81 -- -- 80 83  16 95 % --   03/05/20 0700 112/71 -- -- 86 88 -- 94 % --   03/05/20 0600 110/66 -- -- 85 86 -- 94 % --   03/05/20 0500 99/58 -- -- 87 87 -- 90 % --   03/05/20 0400 122/71 98.5  F (36.9  C) Oral 87 87 -- 95 % --   03/05/20 0300 -- -- -- 87 89 -- 93 % 100.9 kg (222 lb 7.1 oz)   03/05/20 0200 123/68 -- -- 93 88 -- 93 % --   03/05/20 0100 120/66 -- -- 87 85 -- 93 % --   03/05/20 0000 110/68 98.1  F (36.7  C) Oral 85 85 -- 93 % --   03/04/20 2300 108/62 -- -- 85 85 -- 93 % --   03/04/20 2200 121/70 -- -- 81 81 14 94 % --   03/04/20 2100 123/73 -- -- 82 80 16 94 % --   03/04/20 2030 105/70 -- -- 78 79 16 96 % --   03/04/20 2000 103/60 97.6  F (36.4  C) Oral 91 80 18 95 % --   03/04/20 1930 120/74 -- -- 81 80 16 94 % --   03/04/20 1900 112/78 -- -- 78 77 12 97 % --   03/04/20 1830 110/76 -- -- 76 76 14 97 % --   03/04/20 1815 105/76 -- -- 84 87 16 96 % --   03/04/20 1800 122/70 -- -- 75 74 14 96 % --   03/04/20 1745 107/62 -- -- 75 77 16 96 % --   03/04/20 1730 108/71 -- -- 76 77 18 95 % --   03/04/20 1715 122/74 97.6  F (36.4  C) -- -- 75 16 97 % --   03/04/20 1705 110/70 -- -- -- 72 16 98 % --   03/04/20 1655 112/79 -- -- 74 75 18 98 % --   03/04/20 1650 (!) 121/91 -- -- 78 76 10 97 % --   03/04/20 1645 119/77 -- -- 70 73 15 98 % --   03/04/20 1640 110/71 -- -- 72 73 18 97 % --   03/04/20 1635 123/77 -- -- 70 76 10 97 % --   03/04/20 1630 123/76 -- -- 74 75 18 97 % --   03/04/20 1625 123/84 -- -- 78 72 19 96 % --   03/04/20 1620 128/80 -- -- 75 73 18 96 % --   03/04/20 1615 120/70 -- -- 76 74 18 96 % --   03/04/20 1610 125/82 -- -- 78 73 19 95 % --   03/04/20 1605 119/80 -- -- 77 75 20 95 % --   03/04/20 1600 113/75 -- -- 77 77 22 96 % --   03/04/20 1555 119/80 -- -- 77 77 20 95 % --   03/04/20 1550 119/75 -- -- 77 75 11 95 % --   03/04/20 1545 126/68 -- -- 76 77 19 97 % --   03/04/20 1540 121/79 -- -- 80 81 21 96 % --   03/04/20 1535 119/77 -- -- 79 79 19 95 % --   03/04/20 1530 119/76 -- -- 79 78 18 95 % --   03/04/20  1525 114/74 -- -- 81 81 17 94 % --   03/04/20 1520 114/74 -- -- 81 79 20 94 % --   03/04/20 1515 110/70 -- -- 79 78 19 92 % --   03/04/20 1510 106/73 -- -- 79 76 16 94 % --   03/04/20 1505 109/75 -- -- 76 79 19 96 % --   03/04/20 1500 120/72 -- -- 79 79 15 94 % --   03/04/20 1455 117/81 -- -- 80 79 22 94 % --   03/04/20 1450 115/81 -- -- 81 78 20 95 % --   03/04/20 1445 126/72 -- -- 78 78 18 95 % --   03/04/20 1440 124/73 -- -- 77 75 11 97 % --   03/04/20 1435 (!) 120/99 -- -- 78 77 14 97 % --   03/04/20 1430 122/78 -- -- 79 -- 14 97 % --   03/04/20 1415 (!) 137/95 -- -- 79 80 14 95 % --   03/04/20 1400 128/78 -- -- 81 80 20 95 % --   03/04/20 1345 125/78 -- -- -- 73 16 95 % --   03/04/20 1330 121/82 -- -- -- 81 18 93 % --   03/04/20 1315 (!) 125/91 -- -- -- 82 20 96 % --   03/04/20 1300 117/76 -- -- -- 80 22 94 % --   03/04/20 1245 125/77 -- -- -- 80 24 94 % --   03/04/20 1230 126/84 -- -- -- 81 20 95 % --   03/04/20 1215 (!) 87/62 -- -- -- 81 20 95 % --   03/04/20 1200 (!) 123/91 -- -- 82 85 20 96 % --   03/04/20 1145 119/81 -- -- -- 80 24 93 % --   03/04/20 1130 124/82 -- -- -- 80 20 95 % --   03/04/20 1115 127/81 -- -- -- 80 20 95 % --   03/04/20 1100 116/81 -- -- -- 82 20 95 % --   03/04/20 1045 (!) 138/92 -- -- -- 92 20 94 % --   03/04/20 1030 128/89 98.8  F (37.1  C) Axillary 84 81 20 93 % --   03/04/20 1026 (!) 148/91 -- -- -- 84 -- 95 % 104.7 kg (230 lb 13.2 oz)     Wt Readings from Last 4 Encounters:   03/05/20 100.9 kg (222 lb 7.1 oz)   03/04/20 102.1 kg (225 lb)       Intake/Output Summary (Last 24 hours) at 3/5/2020 1009  Last data filed at 3/5/2020 0800  Gross per 24 hour   Intake 3410.12 ml   Output 2120 ml   Net 1290.12 ml     Constitutional: normal  Eyes: normal  ENT: normal  Neck: Supple, symmetrical, trachea midline, no adenopathy, thyroid symmetric, not enlarged and no tenderness, skin normal.  Hematologic / Lymphatic: normal  Back: normal  Lungs: No increased work of breathing, good air  exchange, clear to auscultation bilaterally, no crackles or wheezing.  Cardiovascular: normal  Chest / Breast: normal  Abdomen: normal  Genitourinary: normal  Musculoskeletal: No redness, warmth, or swelling of the joints.  Full range of motion noted.  Motor strength is 5 out of 5 all extremities bilaterally.  Tone is normal.  Neurologic: normal.  Neuropsychiatric: normal  Skin: normal           Data:     Results for orders placed or performed during the hospital encounter of 03/04/20 (from the past 24 hour(s))   Glucose by meter   Result Value Ref Range    Glucose 178 (H) 70 - 99 mg/dL   CBC with platelets   Result Value Ref Range    WBC 9.9 4.0 - 11.0 10e9/L    RBC Count 4.74 4.4 - 5.9 10e12/L    Hemoglobin 14.0 13.3 - 17.7 g/dL    Hematocrit 41.0 40.0 - 53.0 %    MCV 87 78 - 100 fl    MCH 29.5 26.5 - 33.0 pg    MCHC 34.1 31.5 - 36.5 g/dL    RDW 13.0 10.0 - 15.0 %    Platelet Count 67 (L) 150 - 450 10e9/L   Hemoglobin A1c   Result Value Ref Range    Hemoglobin A1C 8.9 (H) 0 - 5.6 %   Methicillin Resist/Sens S. aureus PCR   Result Value Ref Range    Specimen Description Nares     Methicillin Resist/Sens S. aureus PCR Negative NEG^Negative   Factor 10 chromogenic   Result Value Ref Range    Chromogenic Factor 10 88 70 - 130 %   INR   Result Value Ref Range    INR 1.17 (H) 0.86 - 1.14   D dimer quantitative   Result Value Ref Range    D Dimer 5.0 (H) 0.0 - 0.50 ug/ml FEU   Glucose by meter   Result Value Ref Range    Glucose 153 (H) 70 - 99 mg/dL   US Lower Ext Venous Duplex Limited Bilat    Narrative    US LOWER EXT VENOUS DUPLEX LIMITED BILAT 3/4/2020 2:04 PM    CLINICAL HISTORY: unprovoked saddle embolus, patient started on  anticoagulation earlier today.  TECHNIQUE: Venous Duplex ultrasound of bilateral lower extremities  with and without compression, augmentation and duplex. Color flow and  spectral Doppler with waveform analysis performed.    COMPARISON: None.    FINDINGS: Exam includes the common femoral,  femoral, popliteal veins  as well as segmentally visualized deep calf veins and greater  saphenous vein.     RIGHT: Positive for DVT confined to the posterior tibial veins of the  calf. No DVT in the popliteal or more central veins. No superficial  thrombophlebitis. No popliteal cyst.    LEFT: No deep vein thrombosis. No superficial thrombophlebitis. No  popliteal cyst.      Impression    IMPRESSION:  1.  Examination positive for acute appearing DVT limited to the right  calf.    CARLOS PONCE MD   Activated clotting time POCT   Result Value Ref Range    Activated Clot Time 247 (H) 75 - 150 sec   IR Pulmonary Angiogram Bilateral    Narrative    INTERVENTIONAL RADIOGRAPHY PULMONARY ANGIOGRAM BILATERAL 3/4/2020 4:43  PM    HISTORY: 59-year-old patient with saddle pulmonary emboli and right  heart strain both with imaging and with laboratory evaluation in that  there are elevated troponins. Patient developed acute onset chest pain  the night prior with heavy lifting and with extreme shortness of  breath with little activity. Request made for pulmonary angiogram and  mechanical thrombectomy.    TECHNIQUE: Patient was brought to the interventional radiology  department and informed consent obtained. The patient was placed in a  supine position. Skin overlying the right groin was prepped and draped  in standard sterile fashion. Given lack of palpable veins, ultrasound  was used to visualize the right common femoral vein and images stored  for documentation. With continuous ultrasound guidance, micropuncture  kit was used to access the common femoral vein. After serial  dilatation, a 24 [place] dry seal sheath was then deployed and advanced  into the inferior vena cava. Through the sheath, a 90-degree angled  pigtail catheter was advanced through the right atrium, right  ventricle, and into the pulmonary artery with the pigtail formed. No  wire lead during this advancement. The main pulmonary arterial  pressure was  obtained, initially found to be 34/11 with a mean  arterial pressure of 20. Pulmonary angiogram was performed  demonstrating filling defects as identified on CT examination with  saddle embolus and greatest clot burden in the right upper lobe. The  pigtail catheter was exchanged for a C2 catheter over a Bentson wire.  The Super Stiff 1 cm tip Amplatz wire was then advanced into the right  lower lobe. Suction thrombectomy performed in the right lower lobe  with Inari 24 [place] device. The catheter was then directed to the  right upper lobe where suctioned thrombectomy performed. Good results  noted with resolution of nearly all right pulmonary arterial thrombus  with completion right pulmonary artery angiogram. The Inari 24 [place]  suction device was then advanced to the left pulmonary artery where  pulmonary angiogram performed. A Super Stiff Amplatz wire was advanced  into the right lower lobe pulmonary artery with direction of  Berenstein catheter. The 24 [place] device was advanced into the right  lower lobe and suction thrombectomy performed. Little thrombus was  removed, therefore the disc device was used in the right lower lobe  where thrombus was removed from the upper lobe pulmonary arteries.  Suction thrombectomy again performed in the left lower lobe.  Completion left pulmonary angiogram demonstrates overall improved  thrombus burden, some residual thrombus in the left lower lobe, though  overall considerably improved result. Completion pulmonary angiogram  performed. Pressures at completion of the main pulmonary artery are  21/5 with mean arterial pressure of 13 approximately 35 percent  reduction in pressure. A Bentson wire was then advanced through the  pigtail catheter and the catheter was removed. The 24 [place] sheath  was then removed and a pursestring suture was applied. Once the sheath  was removed and pursestring suture utilized, hemostasis achieved with  manual compression. Patient tolerated the  procedure well. Patient's  vital signs remained stable throughout the procedure.    Patient's heparin drip was continued throughout the procedure, as  before. ACT level was drawn shortly after beginning the procedure and  found to be 247. 2000 units of heparin given as a bolus at that time.    Medications: A moderate level of sedation was achieved with 4 mg IV  Versed and 200 mcg IV fentanyl.  Sedation time: 117 minutes  Please note the above medications were administered by the  interventional radiology staff under my direct supervision.  Patient's  vital signs were monitored and remained stable throughout the  procedure.    Fluoroscopic time: 14.6 minutes  Air Kerma: 205 mGy  Contrast: 140 mL of Isovue administered intravenously into the  pulmonary arterial system without complication.  Local anesthetic: 5 mL 1% lidocaine.    FINDINGS: Total of 8 spot fluoroscopic images in pulmonary angiogram  sequences obtained throughout the procedure. Initial pulmonary  angiogram demonstrates bilateral pulmonary emboli with saddle embolus.  Mechanical thrombectomy performed of the right pulmonary artery where  pulmonary angiogram in the right pulmonary artery obtained before and  after mechanical thrombectomy. Good result at completion. Pulmonary  angiogram then performed in the left main pulmonary artery with  suction thrombectomy with little change. The 11-14 mm Inari disc  device was deployed in the lower lobe with removal of thrombus.  Additional mechanical thrombectomies attempted in the lower lobe,  though with little success in this particular location. Completion  pulmonary angiogram demonstrates considerable improvement with only  residual nonocclusive thrombus in the left lower lobe pulmonary  arterial system.      Impression    IMPRESSION: Successful pulmonary arterial mechanical thrombectomy.  Considerable reduction in pulmonary arterial pressures by completion.  Continue heparin drip, as before. Residual  nonocclusive thrombus in  the left lower lobe pulmonary arterial system, otherwise majority of  thrombus successfully removed. Pursestring suture applied at right  groin access site, to be removed the following day.    CLEMENTINE REESE MD   Pulmonary angiogram and mechanical thrombectomy    Narrative    Clementine Reese MD     3/4/2020  5:19 PM  [facility]    Procedure: Pulmonary angiogram and mechanical thrombectomy  Date/Time: 3/4/2020 5:08 PM  Performed by: Clementine Reese MD  Authorized by: Clementine Reese MD     UNIVERSAL PROTOCOL   Site Marked: NA  Prior Images Obtained and Reviewed:  Yes  Required items: Required blood products, implants, devices and special   equipment available    Patient identity confirmed:  Verbally with patient, arm band, provided   demographic data and hospital-assigned identification number  Patient was reevaluated immediately before administering moderate or deep   sedation or anesthesia  Confirmation Checklist:  Patient's identity using two indicators, relevant   allergies, procedure was appropriate and matched the consent or emergent   situation and correct equipment/implants were available  Time out: Immediately prior to the procedure a time out was called    Universal Protocol: the Joint Commission Universal Protocol was followed    Preparation: Patient was prepped and draped in usual sterile fashion           ANESTHESIA    Anesthesia: Local infiltration  Local Anesthetic:  Lidocaine 1% without epinephrine      SEDATION    Patient Sedated: Yes    Vital signs: Vital signs monitored during sedation    See dictated procedure note for full details.  Findings: Right Common Femoral Vein.  Pulmonary angiogram.  Mechanical Thrombectomy of the pulmonary arteries.  Good result at   completion with removal of thrombus.    Main Pulmonary Arterial pressures to begin:  34/11 with MAP of 20.  After intervention, Main PA pressure:  21/5 MAP of  13.    Pursestring suture at venous access site of the left groin.  Heparin drip   continued during the exam, and to be continued after the exam.  ACT was   drawn toward beginning of procedure, and was 247 at 1522.  2000 U bolus of   heparin administered at that time.    Specimens: none    Complications: None    Condition: Stable    PROCEDURE   Patient Tolerance:  Patient tolerated the procedure well with no immediate   complications    Length of time physician/provider present for 1:1 monitoring during   sedation: 135   Glucose by meter   Result Value Ref Range    Glucose 128 (H) 70 - 99 mg/dL   N-terminal Pro BNP Inpatient (AM Draw)   Result Value Ref Range    N-Terminal Pro BNP Inpatient 595 0 - 900 pg/mL   Heparin Xa level (AM Draw)   Result Value Ref Range    Heparin 10A Level 1.00 IU/mL   Glucose by meter   Result Value Ref Range    Glucose 176 (H) 70 - 99 mg/dL   Heparin Xa level (AM Draw)   Result Value Ref Range    Heparin 10A Level 0.54 IU/mL   Basic metabolic panel   Result Value Ref Range    Sodium 136 133 - 144 mmol/L    Potassium 3.6 3.4 - 5.3 mmol/L    Chloride 108 94 - 109 mmol/L    Carbon Dioxide 24 20 - 32 mmol/L    Anion Gap 4 3 - 14 mmol/L    Glucose 175 (H) 70 - 99 mg/dL    Urea Nitrogen 12 7 - 30 mg/dL    Creatinine 0.86 0.66 - 1.25 mg/dL    GFR Estimate >90 >60 mL/min/[1.73_m2]    GFR Estimate If Black >90 >60 mL/min/[1.73_m2]    Calcium 7.6 (L) 8.5 - 10.1 mg/dL   CBC with platelets   Result Value Ref Range    WBC 9.3 4.0 - 11.0 10e9/L    RBC Count 4.73 4.4 - 5.9 10e12/L    Hemoglobin 13.6 13.3 - 17.7 g/dL    Hematocrit 41.2 40.0 - 53.0 %    MCV 87 78 - 100 fl    MCH 28.8 26.5 - 33.0 pg    MCHC 33.0 31.5 - 36.5 g/dL    RDW 13.0 10.0 - 15.0 %    Platelet Count 61 (L) 150 - 450 10e9/L   Glucose by meter   Result Value Ref Range    Glucose 166 (H) 70 - 99 mg/dL

## 2020-03-05 NOTE — PROGRESS NOTES
Interventional Radiology Progress Note:  Inpatient at [facility]  Date: 2020   Patient name: Joseph Kent  MRN:[id number]  :  1960    History: Joseph Kent is a 59 year old old male who developed chest pain and diaphoresis late last evening when lifting a heavy object. Since then he has had some shortness of breath and cough, he thought might be bronchitis. His pain was significant enough to prompt a visit to [facility] ER where a CT scan showed saddle pulmonary embolism with evidence of right heart strain. Patient also has right lower extremity DVT. Patient was transferred to [facility] where he underwent successful mechanical thrombectomy of the pulmonary arteries on 3/4/20.     Interval History: Patient states he is feeling better. Denies chest pain or shortness of breath. Eager to go home. Up and walking in room when I enter, on his way to use the bathroom.    Physical Exam:   /72   Pulse 88   Temp 97.6  F (36.4  C) (Oral)   Resp 16   Wt 100.9 kg (222 lb 7.1 oz)   SpO2 94%   BMI 31.02 kg/m       Exam deferred as patient needs to use the bathroom, but denies any concerns of hematoma or bleeding at the groin site.     Labs:  Recent Labs   Lab 20  0736 20  1034 20  0812   HGB 13.6 14.0 14.7   WBC 9.3 9.9 8.4       Assessment: successful mechanical thrombectomy of the pulmonary arteries for saddle pulmonary embolism on 3/4/20.    Plan: Anticoagulation per vascular medicine, they plan to keep him on heparin drip another 24 hours then transition him to appropriate outpatient anticoagulation. Will remove pursestring suture from right groin site tomorrow. Patient expected to discharge in 1-2 days.     10 minutes were spent with patient during today's visit with greater than 50% of the time spent face to face with the patient, in reviewing medical record and images and in counseling and coordinating patient's care.    Elisha  Reta Fuentes PA-C  Interventional Radiology

## 2020-03-05 NOTE — PROGRESS NOTES
[facility]    Hospitalist Progress Note    Assessment & Plan   Joseph Kent is a 59 year old male with PMHx of DM II and GERD who was admitted from [facility] ED on 3/4/2020 for management of a saddle PE.    Saddle PE, s/p mechanical thrombectomy on 3/4/20  RLE DVT  Presented to ED with complaints of chest pain and diaphoresis that was noted the night prior after lifting a heavy object with inability to take a deep breath. In ED, VSS. Trop mildly elevated at 0.133. CT chest with PE protocol was obtained and showed a saddle pulmonary embolism with embolic burden crossing the midline and leading to all lobes of both lungs with right heart strain with flattening and mild bowing of the interventricular septum suggestive of R heart strain. Heparin drip was started in ED and was transferred to [facility] for thrombectomy per IR. Underwent pulmonary angiography and mechanical thrombectomy per Dr. Reese on 3/4 PM. Had some residual nonocclusive thrombus in the LLL but otherwise majority of the thrombus was successfully removed. US of bilateral LEs showed an acute appearing DVT in the R posterior tibial veins of the calf. No personally hx of blood clots and known fmhx of clotting disorders as patient is adopted. Up to date on age appropriate cancer screenings.  -- vascular medicine following -- cont heparin gtt today, anticipate transition to NOAC tomorrow     Thrombocytopenia, likely secondary to PE  No known hx of thrombocytopenia. Platelet count 63 on presentation. No heavy EtOH, no recent heparin exposure. Suspect secondary to consumption with PE. No s/sx of bleeding elsewhere.   -- monitor platelet counts periodically     Pulmonary nodules, indeterminate  Indeterminate enhancing nodule of liver, posterior right  Incidental findings were noted on CT as above with recommendation for non-urgent liver MRI and surveillance CT for lung nodules in 6- 12 months, then 18- 24 months if no change.  -- monitor as  outpatient -- PCP can arrange for outpatient MRI liver and repeat chest CT     DM II  No known complications.   A1C 9.0 in 9/2019 and manages with metformin and glipizide  Repeat A1C 8.9 this stay.  Oral meds held on admission and was placed on sliding scale insulin    Recent Labs   Lab 03/05/20  0754 03/05/20  0228 03/04/20  2114 03/04/20  1801 03/04/20  1200 03/04/20  1022 03/04/20  0726   GLC  --  175*  --   --   --   --  262*   *  --  176* 128* 153* 178*  --      -- cont sliding scale insulin    GERD  Chronica nd stable on H2 blocker which can be continued    GALI  Chronic and stable. Cont CPAP HS.    FEN: no IVFs, lytes stable, low CHO diet  DVT Prophylaxis: heparin gtt as above  Code Status: Full Code    Disposition: Anticipate discharge home in the next 1-2d, pending he remains clinically stable and tolerating anticoagulation.     Monik Kunz    Interval History   Seen this morning. Feeling well. No specific complaints. Denies cp/sob/cough, abd pain/n/v. Using IS.     -Data reviewed today: I reviewed all new labs and imaging results over the last 24 hours. I personally reviewed no images or EKG's today.    Physical Exam   Temp: 97.6  F (36.4  C) Temp src: Oral BP: 124/72 Pulse: 88 Heart Rate: 86 Resp: 16 SpO2: 94 % O2 Device: None (Room air)    Vitals:    03/04/20 1026 03/05/20 0300   Weight: 104.7 kg (230 lb 13.2 oz) 100.9 kg (222 lb 7.1 oz)     Vital Signs with Ranges  Temp:  [97.6  F (36.4  C)-98.8  F (37.1  C)] 97.6  F (36.4  C)  Pulse:  [70-93] 88  Heart Rate:  [72-92] 86  Resp:  [10-24] 16  BP: ()/(58-99) 124/72  SpO2:  [90 %-98 %] 94 %  I/O last 3 completed shifts:  In: 2841.12 [P.O.:400; I.V.:2441.12]  Out: 1700 [Urine:1700]    Constitutional: Resting comfortably, alert and answering questions appropriately, NAD  Respiratory: CTAB, no wheeze/rales/rhonchi, no increased work of breathing,  Cardiovascular: HRRR, no MGR, trace bilateral LE edema to mid shins (R>L)  GI: S, NT, ND,  +BS  Skin/Integumen: warm/dry  Other:      Medications     HEParin 1,450 Units/hr (03/05/20 0800)     - MEDICATION INSTRUCTIONS -       sodium chloride 150 mL/hr at 03/05/20 0749       insulin aspart  1-10 Units Subcutaneous TID AC     insulin aspart  1-7 Units Subcutaneous At Bedtime     sodium chloride (PF)  3 mL Intracatheter Q8H       Data   Recent Labs   Lab 03/05/20  0736 03/05/20  0228 03/04/20  1127 03/04/20  1034 03/04/20  0941 03/04/20  0812 03/04/20  0726   WBC 9.3  --   --  9.9  --  8.4  --    HGB 13.6  --   --  14.0  --  14.7  --    MCV 87  --   --  87  --  88  --    PLT 61*  --   --  67*  --  63*  --    INR  --   --  1.17*  --   --   --   --    NA  --  136  --   --   --   --  136   POTASSIUM  --  3.6  --   --   --   --  3.8   CHLORIDE  --  108  --   --   --   --  106   CO2  --  24  --   --   --   --  25   BUN  --  12  --   --   --   --  15   CR  --  0.86  --   --   --   --  0.95   ANIONGAP  --  4  --   --   --   --  6   TIFFANIE  --  7.6*  --   --   --   --  8.6   GLC  --  175*  --   --   --   --  262*   TROPI  --   --   --   --  0.120*  --  0.133*       Recent Results (from the past 24 hour(s))   US Lower Ext Venous Duplex Limited Bilat    Narrative    US LOWER EXT VENOUS DUPLEX LIMITED BILAT 3/4/2020 2:04 PM    CLINICAL HISTORY: unprovoked saddle embolus, patient started on  anticoagulation earlier today.  TECHNIQUE: Venous Duplex ultrasound of bilateral lower extremities  with and without compression, augmentation and duplex. Color flow and  spectral Doppler with waveform analysis performed.    COMPARISON: None.    FINDINGS: Exam includes the common femoral, femoral, popliteal veins  as well as segmentally visualized deep calf veins and greater  saphenous vein.     RIGHT: Positive for DVT confined to the posterior tibial veins of the  calf. No DVT in the popliteal or more central veins. No superficial  thrombophlebitis. No popliteal cyst.    LEFT: No deep vein thrombosis. No superficial thrombophlebitis.  No  popliteal cyst.      Impression    IMPRESSION:  1.  Examination positive for acute appearing DVT limited to the right  calf.    CARLOS PONCE MD   IR Pulmonary Angiogram Bilateral    Narrative    INTERVENTIONAL RADIOGRAPHY PULMONARY ANGIOGRAM BILATERAL 3/4/2020 4:43  PM    HISTORY: 59-year-old patient with saddle pulmonary emboli and right  heart strain both with imaging and with laboratory evaluation in that  there are elevated troponins. Patient developed acute onset chest pain  the night prior with heavy lifting and with extreme shortness of  breath with little activity. Request made for pulmonary angiogram and  mechanical thrombectomy.    TECHNIQUE: Patient was brought to the interventional radiology  department and informed consent obtained. The patient was placed in a  supine position. Skin overlying the right groin was prepped and draped  in standard sterile fashion. Given lack of palpable veins, ultrasound  was used to visualize the right common femoral vein and images stored  for documentation. With continuous ultrasound guidance, micropuncture  kit was used to access the common femoral vein. After serial  dilatation, a 24 [place] dry seal sheath was then deployed and advanced  into the inferior vena cava. Through the sheath, a 90-degree angled  pigtail catheter was advanced through the right atrium, right  ventricle, and into the pulmonary artery with the pigtail formed. No  wire lead during this advancement. The main pulmonary arterial  pressure was obtained, initially found to be 34/11 with a mean  arterial pressure of 20. Pulmonary angiogram was performed  demonstrating filling defects as identified on CT examination with  saddle embolus and greatest clot burden in the right upper lobe. The  pigtail catheter was exchanged for a C2 catheter over a Bentson wire.  The Super Stiff 1 cm tip Amplatz wire was then advanced into the right  lower lobe. Suction thrombectomy performed in the right lower  lobe  with Inari 24 [place] device. The catheter was then directed to the  right upper lobe where suctioned thrombectomy performed. Good results  noted with resolution of nearly all right pulmonary arterial thrombus  with completion right pulmonary artery angiogram. The Inari 24 [place]  suction device was then advanced to the left pulmonary artery where  pulmonary angiogram performed. A Super Stiff Amplatz wire was advanced  into the right lower lobe pulmonary artery with direction of  Berenstein catheter. The 24 [place] device was advanced into the right  lower lobe and suction thrombectomy performed. Little thrombus was  removed, therefore the disc device was used in the right lower lobe  where thrombus was removed from the upper lobe pulmonary arteries.  Suction thrombectomy again performed in the left lower lobe.  Completion left pulmonary angiogram demonstrates overall improved  thrombus burden, some residual thrombus in the left lower lobe, though  overall considerably improved result. Completion pulmonary angiogram  performed. Pressures at completion of the main pulmonary artery are  21/5 with mean arterial pressure of 13 approximately 35 percent  reduction in pressure. A VelociDatason wire was then advanced through the  pigtail catheter and the catheter was removed. The 24 [place] sheath  was then removed and a pursestring suture was applied. Once the sheath  was removed and pursestring suture utilized, hemostasis achieved with  manual compression. Patient tolerated the procedure well. Patient's  vital signs remained stable throughout the procedure.    Patient's heparin drip was continued throughout the procedure, as  before. ACT level was drawn shortly after beginning the procedure and  found to be 247. 2000 units of heparin given as a bolus at that time.    Medications: A moderate level of sedation was achieved with 4 mg IV  Versed and 200 mcg IV fentanyl.  Sedation time: 117 minutes  Please note the above  medications were administered by the  interventional radiology staff under my direct supervision.  Patient's  vital signs were monitored and remained stable throughout the  procedure.    Fluoroscopic time: 14.6 minutes  Air Kerma: 205 mGy  Contrast: 140 mL of Isovue administered intravenously into the  pulmonary arterial system without complication.  Local anesthetic: 5 mL 1% lidocaine.    FINDINGS: Total of 8 spot fluoroscopic images in pulmonary angiogram  sequences obtained throughout the procedure. Initial pulmonary  angiogram demonstrates bilateral pulmonary emboli with saddle embolus.  Mechanical thrombectomy performed of the right pulmonary artery where  pulmonary angiogram in the right pulmonary artery obtained before and  after mechanical thrombectomy. Good result at completion. Pulmonary  angiogram then performed in the left main pulmonary artery with  suction thrombectomy with little change. The 11-14 mm Inari disc  device was deployed in the lower lobe with removal of thrombus.  Additional mechanical thrombectomies attempted in the lower lobe,  though with little success in this particular location. Completion  pulmonary angiogram demonstrates considerable improvement with only  residual nonocclusive thrombus in the left lower lobe pulmonary  arterial system.      Impression    IMPRESSION: Successful pulmonary arterial mechanical thrombectomy.  Considerable reduction in pulmonary arterial pressures by completion.  Continue heparin drip, as before. Residual nonocclusive thrombus in  the left lower lobe pulmonary arterial system, otherwise majority of  thrombus successfully removed. Pursestring suture applied at right  groin access site, to be removed the following day.    CLEMENTINE LUCIA MD

## 2020-03-05 NOTE — PROGRESS NOTES
Pt returned at 1705, Neuros intact. No SOB, Lungs clear. Sats 92 or greater on room air.  Right femoral site clean/dry/intact. Dorsal pedal and post. Tibial 2 plus on both legs. Eval: Stable

## 2020-03-06 VITALS
RESPIRATION RATE: 16 BRPM | HEART RATE: 90 BPM | OXYGEN SATURATION: 95 % | BODY MASS INDEX: 31.02 KG/M2 | DIASTOLIC BLOOD PRESSURE: 74 MMHG | WEIGHT: 222.44 LBS | SYSTOLIC BLOOD PRESSURE: 124 MMHG | TEMPERATURE: 97.9 F

## 2020-03-06 LAB
ANION GAP SERPL CALCULATED.3IONS-SCNC: 2 MMOL/L (ref 3–14)
BASOPHILS # BLD AUTO: 0 10E9/L (ref 0–0.2)
BASOPHILS NFR BLD AUTO: 0.2 %
BUN SERPL-MCNC: 12 MG/DL (ref 7–30)
CALCIUM SERPL-MCNC: 8.1 MG/DL (ref 8.5–10.1)
CHLORIDE SERPL-SCNC: 108 MMOL/L (ref 94–109)
CO2 SERPL-SCNC: 27 MMOL/L (ref 20–32)
CREAT SERPL-MCNC: 0.92 MG/DL (ref 0.66–1.25)
DIFFERENTIAL METHOD BLD: ABNORMAL
EOSINOPHIL # BLD AUTO: 0.4 10E9/L (ref 0–0.7)
EOSINOPHIL NFR BLD AUTO: 7.9 %
ERYTHROCYTE [DISTWIDTH] IN BLOOD BY AUTOMATED COUNT: 13 % (ref 10–15)
GFR SERPL CREATININE-BSD FRML MDRD: 90 ML/MIN/{1.73_M2}
GLUCOSE BLDC GLUCOMTR-MCNC: 142 MG/DL (ref 70–99)
GLUCOSE BLDC GLUCOMTR-MCNC: 161 MG/DL (ref 70–99)
GLUCOSE SERPL-MCNC: 152 MG/DL (ref 70–99)
HCT VFR BLD AUTO: 39.6 % (ref 40–53)
HGB BLD-MCNC: 13.2 G/DL (ref 13.3–17.7)
IMM GRANULOCYTES # BLD: 0 10E9/L (ref 0–0.4)
IMM GRANULOCYTES NFR BLD: 0.2 %
LMWH PPP CHRO-ACNC: 0.38 IU/ML
LYMPHOCYTES # BLD AUTO: 0.7 10E9/L (ref 0.8–5.3)
LYMPHOCYTES NFR BLD AUTO: 13.3 %
MAGNESIUM SERPL-MCNC: 2.1 MG/DL (ref 1.6–2.3)
MCH RBC QN AUTO: 29 PG (ref 26.5–33)
MCHC RBC AUTO-ENTMCNC: 33.3 G/DL (ref 31.5–36.5)
MCV RBC AUTO: 87 FL (ref 78–100)
MONOCYTES # BLD AUTO: 0.4 10E9/L (ref 0–1.3)
MONOCYTES NFR BLD AUTO: 7.9 %
NEUTROPHILS # BLD AUTO: 3.8 10E9/L (ref 1.6–8.3)
NEUTROPHILS NFR BLD AUTO: 70.5 %
NRBC # BLD AUTO: 0 10*3/UL
NRBC BLD AUTO-RTO: 0 /100
PLATELET # BLD AUTO: 69 10E9/L (ref 150–450)
POTASSIUM SERPL-SCNC: 3.9 MMOL/L (ref 3.4–5.3)
RBC # BLD AUTO: 4.55 10E12/L (ref 4.4–5.9)
SODIUM SERPL-SCNC: 137 MMOL/L (ref 133–144)
WBC # BLD AUTO: 5.4 10E9/L (ref 4–11)

## 2020-03-06 PROCEDURE — 99239 HOSP IP/OBS DSCHRG MGMT >30: CPT | Performed by: INTERNAL MEDICINE

## 2020-03-06 PROCEDURE — 83735 ASSAY OF MAGNESIUM: CPT | Performed by: INTERNAL MEDICINE

## 2020-03-06 PROCEDURE — 36415 COLL VENOUS BLD VENIPUNCTURE: CPT | Performed by: INTERNAL MEDICINE

## 2020-03-06 PROCEDURE — 00000146 ZZHCL STATISTIC GLUCOSE BY METER IP

## 2020-03-06 PROCEDURE — 85025 COMPLETE CBC W/AUTO DIFF WBC: CPT | Performed by: INTERNAL MEDICINE

## 2020-03-06 PROCEDURE — 80048 BASIC METABOLIC PNL TOTAL CA: CPT | Performed by: INTERNAL MEDICINE

## 2020-03-06 PROCEDURE — 85520 HEPARIN ASSAY: CPT | Performed by: INTERNAL MEDICINE

## 2020-03-06 PROCEDURE — 25000128 H RX IP 250 OP 636: Performed by: INTERNAL MEDICINE

## 2020-03-06 PROCEDURE — 99233 SBSQ HOSP IP/OBS HIGH 50: CPT | Performed by: INTERNAL MEDICINE

## 2020-03-06 PROCEDURE — 25000132 ZZH RX MED GY IP 250 OP 250 PS 637: Performed by: PHYSICIAN ASSISTANT

## 2020-03-06 RX ADMIN — RIVAROXABAN 15 MG: 15 TABLET, FILM COATED ORAL at 11:29

## 2020-03-06 RX ADMIN — HEPARIN SODIUM 1450 UNITS/HR: 10000 INJECTION, SOLUTION INTRAVENOUS at 06:33

## 2020-03-06 ASSESSMENT — ACTIVITIES OF DAILY LIVING (ADL)
ADLS_ACUITY_SCORE: 13

## 2020-03-06 NOTE — DISCHARGE SUMMARY
[facility]    Discharge Summary  Hospitalist    Date of Admission:  3/4/2020  Date of Discharge:  3/6/2020  Discharging Provider: Monik Kunz    Discharge Diagnoses   Saddle PE, s/p mechanical thrombectomy per IR on 3/4/20  RLE DVT  Thrombocytopenia, likely secondary to PE  Pulmonary nodules, indeterminate  Indeterminate enhancing nodule of liver  DM II  GERD  GALI    History of Present Illness   Joseph Kent is a 59 year old male with PMHx of DM II and GERD who was admitted from [facility] ED on 3/4/2020 for management of a saddle PE.    Hospital Course   Joseph Kent was admitted on 3/4/2020.  The following problems were addressed during his hospitalization:     Saddle PE, s/p mechanical thrombectomy per interventional radiology on 3/4/20  RLE DVT  Presented to ED with complaints of chest pain and diaphoresis that was noted the night prior after lifting a heavy object with inability to take a deep breath. In ED, VSS. Trop mildly elevated at 0.133. CT chest with PE protocol was obtained and showed a saddle pulmonary embolism with embolic burden crossing the midline and leading to all lobes of both lungs with right heart strain with flattening and mild bowing of the interventricular septum suggestive of R heart strain. Heparin drip was started in ED and was transferred to [facility] for thrombectomy per IR. Underwent pulmonary angiography and mechanical thrombectomy per Dr. Reese on 3/4 PM. Had some residual nonocclusive thrombus in the LLL but otherwise majority of the thrombus was successfully removed. US of bilateral LEs showed an acute appearing DVT in the R posterior tibial veins of the calf. No personally hx of blood clots and no known fmhx of clotting disorders as patient is adopted. Up to date on age appropriate cancer screenings. Initial workup this stay was not suggestive of antiphospholipid Ab syndrome (as INR correlated with chromogenic factor X), no other  hypercoag workup pursued this stay dt initiation of anticoagulation.     Remained stable on a heparin gtt post thrombectomy. O2 sats stable on RA. No respiratory complaints. Transitioned to Xarelto on the day of discharge (15mg po BID x 3wks, then 20mg po daily). Will need to follow up with vascular medicine (Dr. You) in clinic in 1 month. Recommended compression stockings (20-30 mmHg) when awake during the day.     Thrombocytopenia, likely secondary to PE  No known hx of thrombocytopenia. Platelet count 63 on presentation. No heavy EtOH, no recent heparin exposure. Suspect secondary to consumption with PE. No s/sx of bleeding elsewhere. Platelet counts remained low but stable during stay.  Monitor platelet counts. If remain low, consider further workup.     Pulmonary nodules, indeterminate  Indeterminate enhancing nodule of liver  Incidental findings were noted on CT as above with recommendation for non-urgent liver MRI and surveillance CT for lung nodules in 6- 12 months, then 18- 24 months if no change.  Monitor as outpatient -- PCP can arrange for outpatient MRI liver and repeat chest CT     DM II  No known complications. A1C 9.0 in 9/2019 and manages with metformin and glipizide.   Repeat A1C 8.9 this stay. Oral meds held on admission and was placed on sliding scale insulin.   Meds were resumed at discharge  Will need follow up with PCP to discuss ongoing management -- recommended to consider initiation of Jardiance    GERD  Chronic and stable on H2 blocker .    GALI  Chronic and stable. Cont CPAP HS.     Monik Kunz, DO    Significant Results and Procedures      Pulmonary Angiogram with Mechanical Thrombectomy on 3/4/20:  Successful pulmonary arterial mechanical thrombectomy.  Considerable reduction in pulmonary arterial pressures by completion.  Continue heparin drip, as before. Residual nonocclusive thrombus in  the left lower lobe pulmonary arterial system, otherwise majority of  thrombus  successfully removed.    Code Status   Full Code       Primary Care Physician   Uyen Armas    Physical Exam   Temp: 97.9  F (36.6  C) Temp src: Oral BP: 124/74 Pulse: 90 Heart Rate: 79 Resp: 16 SpO2: 95 % O2 Device: None (Room air) Oxygen Delivery: 2 LPM  Vitals:    03/04/20 1026 03/05/20 0300   Weight: 104.7 kg (230 lb 13.2 oz) 100.9 kg (222 lb 7.1 oz)     Vital Signs with Ranges  Temp:  [97.9  F (36.6  C)-98.4  F (36.9  C)] 97.9  F (36.6  C)  Pulse:  [78-90] 90  Heart Rate:  [77-90] 79  Resp:  [16-20] 16  BP: (108-148)/(62-86) 124/74  SpO2:  [93 %-96 %] 95 %  I/O last 3 completed shifts:  In: 2796 [P.O.:1920; I.V.:876]  Out: 420 [Urine:420]    General: Resting comfortably, alert, conversive, NAD  CVS: HRRR, no MGR, no LE edema  Respiratory: CTAB, no wheeze/rales/rhonchi, no increased work of breathing  GI: S, NT, ND, +BS  Skin: Warm/dry    Discharge Disposition   Discharged to home  Condition at discharge: Stable    Consultations This Hospital Stay   [facility] IP CONSULT    Time Spent on this Encounter   IMonik DO, personally saw the patient today and spent greater than 30 minutes discharging this patient.    Discharge Orders      Follow-up and recommended labs and tests     Follow up with Dr. You of Vascular Medicine in the [facility] in 3-4 weeks. Please call [phone] for an appointment. This will be to determine duration of anticoagulation.     Reason for your hospital stay    Management of the large blood clot in your lungs, which required removal per interventional radiologist. The clot appears to have originated from your right leg. You were started on a blood thinner (Xarelto) to reduce your risk of developing another clot.     Follow-up and recommended labs and tests     1. Follow up with PCP in the next week for post-hospital visit. Will need to re-evaluate your diabetes and discuss ongoing options for management.   2.  Follow up with Dr. You (Vascular Clinic) as advised.     Activity    Your activity upon discharge: activity as tolerated     Diet    Follow this diet upon discharge: Moderate consistent carbohydrate (6140-5822 geneva / 4-6 CHO units per meal)     Discharge Medications   Current Discharge Medication List      START taking these medications    Details   rivaroxaban ANTICOAGULANT (XARELTO) 20 MG TABS tablet 15 mg twice daily with food for 3 weeks, then 20 mg once daily with food (NOTE: will need 42 15 mg tablets the first time this is filled)  Qty: 60 tablet, Refills: 1    Comments: Future refills by primary care physician or vascular medicine physician  Associated Diagnoses: Acute saddle pulmonary embolism with acute cor pulmonale (H)         CONTINUE these medications which have NOT CHANGED    Details   albuterol (PROVENTIL) (2.5 MG/3ML) 0.083% neb solution Take 2.5 mg by nebulization every 6 hours as needed for shortness of breath / dyspnea or wheezing      fluticasone (FLONASE) 50 MCG/ACT nasal spray Spray 1 spray into both nostrils daily as needed       glipiZIDE (GLUCOTROL XL) 10 MG 24 hr tablet Take 10 mg by mouth daily      metFORMIN (GLUCOPHAGE-XR) 500 MG 24 hr tablet Take 1,000 mg by mouth daily (with dinner)       ranitidine (ZANTAC) 150 MG tablet Take 150 mg by mouth every other day          STOP taking these medications       aspirin 81 MG EC tablet Comments:   Reason for Stopping:             Allergies   No Known Allergies     Data   Most Recent 3 CBC's:  Recent Labs   Lab Test 03/06/20  0757 03/05/20  0736 03/04/20  1034   WBC 5.4 9.3 9.9   HGB 13.2* 13.6 14.0   MCV 87 87 87   PLT 69* 61* 67*      Most Recent 3 BMP's:  Recent Labs   Lab Test 03/06/20  0757 03/05/20  1003 03/05/20  0228 03/04/20  0726     --  136 136   POTASSIUM 3.9 3.8 3.6 3.8   CHLORIDE 108  --  108 106   CO2 27  --  24 25   BUN 12  --  12 15   CR 0.92  --  0.86 0.95   ANIONGAP 2*  --  4 6   GENEVA 8.1*  --  7.6* 8.6   *  --   175* 262*     Most Recent INR's and Anticoagulation Dosing History:  Anticoagulation Dose History     Recent Dosing and Labs Latest Ref Rng & Units 3/4/2020    INR 0.86 - 1.14 1.17(H)    Chromogenic Factor 10 70 - 130 % 88        Most Recent 3 Troponin's:  Recent Labs   Lab Test 03/04/20  0941 03/04/20  0726   TROPI 0.120* 0.133*     Most Recent TSH, T4 and A1c Labs:  Recent Labs   Lab Test 03/04/20  1034   A1C 8.9*     Results for orders placed or performed during the hospital encounter of 03/04/20   IR Pulmonary Angiogram Bilateral    Narrative    INTERVENTIONAL RADIOGRAPHY PULMONARY ANGIOGRAM BILATERAL 3/4/2020 4:43  PM    HISTORY: 59-year-old patient with saddle pulmonary emboli and right  heart strain both with imaging and with laboratory evaluation in that  there are elevated troponins. Patient developed acute onset chest pain  the night prior with heavy lifting and with extreme shortness of  breath with little activity. Request made for pulmonary angiogram and  mechanical thrombectomy.    TECHNIQUE: Patient was brought to the interventional radiology  department and informed consent obtained. The patient was placed in a  supine position. Skin overlying the right groin was prepped and draped  in standard sterile fashion. Given lack of palpable veins, ultrasound  was used to visualize the right common femoral vein and images stored  for documentation. With continuous ultrasound guidance, micropuncture  kit was used to access the common femoral vein. After serial  dilatation, a 24 Malaysian dry seal sheath was then deployed and advanced  into the inferior vena cava. Through the sheath, a 90-degree angled  pigtail catheter was advanced through the right atrium, right  ventricle, and into the pulmonary artery with the pigtail formed. No  wire lead during this advancement. The main pulmonary arterial  pressure was obtained, initially found to be 34/11 with a mean  arterial pressure of 20. Pulmonary angiogram was  performed  demonstrating filling defects as identified on CT examination with  saddle embolus and greatest clot burden in the right upper lobe. The  pigtail catheter was exchanged for a C2 catheter over a Bentson wire.  The Super Stiff 1 cm tip Amplatz wire was then advanced into the right  lower lobe. Suction thrombectomy performed in the right lower lobe  with Inari 24 Vietnamese device. The catheter was then directed to the  right upper lobe where suctioned thrombectomy performed. Good results  noted with resolution of nearly all right pulmonary arterial thrombus  with completion right pulmonary artery angiogram. The Inari 24 Vietnamese  suction device was then advanced to the left pulmonary artery where  pulmonary angiogram performed. A Super Stiff Amplatz wire was advanced  into the right lower lobe pulmonary artery with direction of  Berenstein catheter. The 24 Vietnamese device was advanced into the right  lower lobe and suction thrombectomy performed. Little thrombus was  removed, therefore the disc device was used in the right lower lobe  where thrombus was removed from the upper lobe pulmonary arteries.  Suction thrombectomy again performed in the left lower lobe.  Completion left pulmonary angiogram demonstrates overall improved  thrombus burden, some residual thrombus in the left lower lobe, though  overall considerably improved result. Completion pulmonary angiogram  performed. Pressures at completion of the main pulmonary artery are  21/5 with mean arterial pressure of 13 approximately 35 percent  reduction in pressure. A Bentson wire was then advanced through the  pigtail catheter and the catheter was removed. The 24 Vietnamese sheath  was then removed and a pursestring suture was applied. Once the sheath  was removed and pursestring suture utilized, hemostasis achieved with  manual compression. Patient tolerated the procedure well. Patient's  vital signs remained stable throughout the procedure.    Patient's heparin  drip was continued throughout the procedure, as  before. ACT level was drawn shortly after beginning the procedure and  found to be 247. 2000 units of heparin given as a bolus at that time.    Medications: A moderate level of sedation was achieved with 4 mg IV  Versed and 200 mcg IV fentanyl.  Sedation time: 117 minutes  Please note the above medications were administered by the  interventional radiology staff under my direct supervision.  Patient's  vital signs were monitored and remained stable throughout the  procedure.    Fluoroscopic time: 14.6 minutes  Air Kerma: 205 mGy  Contrast: 140 mL of Isovue administered intravenously into the  pulmonary arterial system without complication.  Local anesthetic: 5 mL 1% lidocaine.    FINDINGS: Total of 8 spot fluoroscopic images in pulmonary angiogram  sequences obtained throughout the procedure. Initial pulmonary  angiogram demonstrates bilateral pulmonary emboli with saddle embolus.  Mechanical thrombectomy performed of the right pulmonary artery where  pulmonary angiogram in the right pulmonary artery obtained before and  after mechanical thrombectomy. Good result at completion. Pulmonary  angiogram then performed in the left main pulmonary artery with  suction thrombectomy with little change. The 11-14 mm Inari disc  device was deployed in the lower lobe with removal of thrombus.  Additional mechanical thrombectomies attempted in the lower lobe,  though with little success in this particular location. Completion  pulmonary angiogram demonstrates considerable improvement with only  residual nonocclusive thrombus in the left lower lobe pulmonary  arterial system.      Impression    IMPRESSION: Successful pulmonary arterial mechanical thrombectomy.  Considerable reduction in pulmonary arterial pressures by completion.  Continue heparin drip, as before. Residual nonocclusive thrombus in  the left lower lobe pulmonary arterial system, otherwise majority of  thrombus  successfully removed. Pursestring suture applied at right  groin access site, to be removed the following day.    CLEMENTINE LUCIA MD   US Lower Ext Venous Duplex Limited Bilat    Narrative    US LOWER EXT VENOUS DUPLEX LIMITED BILAT 3/4/2020 2:04 PM    CLINICAL HISTORY: unprovoked saddle embolus, patient started on  anticoagulation earlier today.  TECHNIQUE: Venous Duplex ultrasound of bilateral lower extremities  with and without compression, augmentation and duplex. Color flow and  spectral Doppler with waveform analysis performed.    COMPARISON: None.    FINDINGS: Exam includes the common femoral, femoral, popliteal veins  as well as segmentally visualized deep calf veins and greater  saphenous vein.     RIGHT: Positive for DVT confined to the posterior tibial veins of the  calf. No DVT in the popliteal or more central veins. No superficial  thrombophlebitis. No popliteal cyst.    LEFT: No deep vein thrombosis. No superficial thrombophlebitis. No  popliteal cyst.      Impression    IMPRESSION:  1.  Examination positive for acute appearing DVT limited to the right  calf.    CARLOS PONCE MD

## 2020-03-06 NOTE — DISCHARGE INSTRUCTIONS
Patient will need follow up right leg venous ultrasound and consult with Dr. Reese in 1 month at the [facility]  Call [facility] at [phone] to schedule  If questions contact Madonna Duran [phone]

## 2020-03-06 NOTE — PROVIDER NOTIFICATION
MD Aster Castro notified about patient status and clarification of whether patient needed to remain on IMC or not. Received orders to discontinue IMC but continue the patient on Cardiac monitoring.

## 2020-03-06 NOTE — PLAN OF CARE
A & O x 4.  Tolerating a mod-carb diet.  Sliding scale insulin given.  BS+, Flatus+.  Voiding adequately.  Independent.  R groin puncture site, no change, widespread bruising.  Tele= NSR.  Heparin stopped; orals started.  Pt. Educated on discharge orders.  Pt. Given Xarelto filled prescription.  Pt. Discharged to home with wife.

## 2020-03-06 NOTE — PLAN OF CARE
AVSS on RA. Pain controlled with PO. Left groin puncture site CDI. LS clear and equal. Diet regular. SBA. Denies pain. Verbalizes readiness to discharge. Hep gtt continued at 14.5.

## 2020-03-06 NOTE — PROGRESS NOTES
[facility]  Vascular Medicine Progress Note          Physician Supervisory Attestation:   I have reviewed and discussed with the physician assistant their history, physical and plan and independently interviewed and examined Joseph Kent and agree with the plan as stated in the physician assistant note.    He is doing well, tolerating IV heparin  No thrombophilia eval warranted as this is the patient's first VTE event.   He is up to date on cancer screening.  CFX correlates with INR, thus making APLA positivity highly unlikely. Pt could therefore use NOAC.   Stopped IV heparin this morning and started Xarelto 15 mg BID for 3 weeks with food, followed by 20 mg daily with supper thereafter.     He should follow-up with Dr. [facility] in one month ([phone]).   Utilize calf high compression stockings (20-30 mmHg) during waking hours.   Rest of the medical management per hospitalist service    OK to discharge from Vascular Medicine perspective.   Vascular medicine service will sign off please call us with any questions    Patient care time spent 35 minutes today     Michael Villeda MD . [place],[facility]  Vascular medicine  3/6/2020         Assessment and Plan:   Active Problems:    First lifetime VTE presenting as unprovoked RLE PTV DVT w/ saddle pulmonary embolism and right heart strain with successful pulmonary artery suction thrombectomy 3/4/20 without need for IVC filter placement based upon degree of thrombus retrieval and infrageniculate remaining RLE DVT.    Assessment:     -Doing well from HD and respiratory perspective S/P successful large volume PA suction thrombectomy.  -No filter needed as above.  -Rt groin purse string suture removed  -Doing well on IV UFH gtt.    Plan:     -No thrombophilia eval warranted as this is the patient's first VTE event.   -He is up to date on cancer screening.  -CFX correlates with INR, thus making APLA positivity highly  unlikely. Pt could therefore use NOAC.   -Stopped IV heparin and started Xarelto 15 mg BID for 3 weeks, followed by 20 mg daily with supper thereafter.   -He should follow-up with Dr. You in the [facility] in one month ([phone]).   -Utilize calf high compression stockings (20-30 mmHg) during waking hours.   -OK to discharge from Vascular Medicine perspective.     Type 2 Diabetes      Assessment:     -Not at A1C goal on metformin, glipizide as outpt.  -ACs suboptimal on medium IR sliding scale cvg.    Plan:     -Hold metformin until today due to contrast yesterday.  -Low consistent CHO diet, increase sliding scale cvg to high IR.  -Initiate Jardiance as an outpt to minimize CV mortality, events.                 Interval History:   doing well; no cp, sob, n/v/d, or abd pain.              Review of Systems:   The 10 point Review of Systems is negative other than noted in the HPI             Medications:       insulin aspart  1-10 Units Subcutaneous TID AC     insulin aspart  1-7 Units Subcutaneous At Bedtime     rivaroxaban ANTICOAGULANT  15 mg Oral BID w/meals     sodium chloride (PF)  3 mL Intracatheter Q8H                  Physical Exam:     Patient Vitals for the past 24 hrs:   BP Temp Temp src Pulse Heart Rate Resp SpO2   03/06/20 1117 124/74 97.9  F (36.6  C) Oral -- 79 16 95 %   03/06/20 0817 111/67 98.1  F (36.7  C) Oral -- 77 16 93 %   03/06/20 0000 108/62 98  F (36.7  C) Oral -- 88 16 94 %   03/05/20 2006 -- 98.4  F (36.9  C) Axillary -- -- -- 94 %   03/05/20 2000 120/78 -- -- 90 88 16 --   03/05/20 1800 136/86 -- -- 87 90 20 95 %   03/05/20 1600 (!) 148/74 98.4  F (36.9  C) Oral 78 -- 16 96 %   03/05/20 1400 112/68 -- -- 86 88 -- 94 %   03/05/20 1350 -- -- -- -- -- -- 94 %     Wt Readings from Last 4 Encounters:   03/05/20 100.9 kg (222 lb 7.1 oz)   03/04/20 102.1 kg (225 lb)       Intake/Output Summary (Last 24 hours) at 3/5/2020 1009  Last data filed at 3/5/2020 0800  Gross per 24 hour    Intake 3410.12 ml   Output 2120 ml   Net 1290.12 ml     Constitutional: normal  Eyes: normal  ENT: normal  Neck: Supple, symmetrical, trachea midline, no adenopathy, thyroid symmetric, not enlarged and no tenderness, skin normal.  Hematologic / Lymphatic: normal  Back: normal  Lungs: No increased work of breathing, good air exchange, clear to auscultation bilaterally, no crackles or wheezing.  Cardiovascular: normal  Chest / Breast: normal  Abdomen: normal  Genitourinary: normal  Musculoskeletal: No redness, warmth, or swelling of the joints.  Full range of motion noted.  Motor strength is 5 out of 5 all extremities bilaterally.  Tone is normal.  Neurologic: normal.  Neuropsychiatric: normal  Skin: normal           Data:     Results for orders placed or performed during the hospital encounter of 03/04/20 (from the past 24 hour(s))   Glucose by meter   Result Value Ref Range    Glucose 170 (H) 70 - 99 mg/dL   Glucose by meter   Result Value Ref Range    Glucose 167 (H) 70 - 99 mg/dL   Glucose by meter   Result Value Ref Range    Glucose 142 (H) 70 - 99 mg/dL   CBC with platelets differential   Result Value Ref Range    WBC 5.4 4.0 - 11.0 10e9/L    RBC Count 4.55 4.4 - 5.9 10e12/L    Hemoglobin 13.2 (L) 13.3 - 17.7 g/dL    Hematocrit 39.6 (L) 40.0 - 53.0 %    MCV 87 78 - 100 fl    MCH 29.0 26.5 - 33.0 pg    MCHC 33.3 31.5 - 36.5 g/dL    RDW 13.0 10.0 - 15.0 %    Platelet Count 69 (L) 150 - 450 10e9/L    Diff Method Automated Method     % Neutrophils 70.5 %    % Lymphocytes 13.3 %    % Monocytes 7.9 %    % Eosinophils 7.9 %    % Basophils 0.2 %    % Immature Granulocytes 0.2 %    Nucleated RBCs 0 0 /100    Absolute Neutrophil 3.8 1.6 - 8.3 10e9/L    Absolute Lymphocytes 0.7 (L) 0.8 - 5.3 10e9/L    Absolute Monocytes 0.4 0.0 - 1.3 10e9/L    Absolute Eosinophils 0.4 0.0 - 0.7 10e9/L    Absolute Basophils 0.0 0.0 - 0.2 10e9/L    Abs Immature Granulocytes 0.0 0 - 0.4 10e9/L    Absolute Nucleated RBC 0.0    Basic  metabolic panel   Result Value Ref Range    Sodium 137 133 - 144 mmol/L    Potassium 3.9 3.4 - 5.3 mmol/L    Chloride 108 94 - 109 mmol/L    Carbon Dioxide 27 20 - 32 mmol/L    Anion Gap 2 (L) 3 - 14 mmol/L    Glucose 152 (H) 70 - 99 mg/dL    Urea Nitrogen 12 7 - 30 mg/dL    Creatinine 0.92 0.66 - 1.25 mg/dL    GFR Estimate 90 >60 mL/min/[1.73_m2]    GFR Estimate If Black >90 >60 mL/min/[1.73_m2]    Calcium 8.1 (L) 8.5 - 10.1 mg/dL   Magnesium   Result Value Ref Range    Magnesium 2.1 1.6 - 2.3 mg/dL   Heparin Xa (10a) Level   Result Value Ref Range    Heparin 10A Level 0.38 IU/mL   Glucose by meter   Result Value Ref Range    Glucose 161 (H) 70 - 99 mg/dL

## 2020-03-06 NOTE — PLAN OF CARE
VSS on room air. Tele NSR. A/Ox4. Incisions on right groin with gauze, CDI, area soft to touch. Denies pain. Up with SBA, ambulated halls on shift. Mod carb diet. BS active, Flatus +. Voiding to bathroom adequately. LS clear. Hep gtt at 14.5, hep 10a recheck tomorrow AM. Plan to discharge to home tomorrow once oral anticoagulation plan established.

## 2020-03-06 NOTE — PROGRESS NOTES
Interventional Radiology Progress Note:  Inpatient at [facility]  Date: 2020   Patient name: Joseph Kent  MRN:[id number]  :  1960    History: Joseph Kent is a 59 year old old male who developed chest pain and diaphoresis late last evening when lifting a heavy object. Since then he has had some shortness of breath and cough, he thought might be bronchitis. His pain was significant enough to prompt a visit to [facility] ER where a CT scan showed saddle pulmonary embolism with evidence of right heart strain. Patient also has right lower extremity DVT. Patient was transferred to [facility] where he underwent successful mechanical thrombectomy of the pulmonary arteries on 3/4/20.     Interval History: Patient states he is feeling significantly better. Denies chest pain or shortness of breath. Eager to go home. Has been up and walking in the halls. Planning to go home on Eliquis.     Physical Exam:   /74 (BP Location: Right arm)   Pulse 90   Temp 97.9  F (36.6  C) (Oral)   Resp 16   Wt 100.9 kg (222 lb 7.1 oz)   SpO2 95%   BMI 31.02 kg/m       Patient sitting up in bed, no acute distress. No coughing or dyspnea. Right groin site with suture in place. Moderate ecchmyosis and tenderness over the area. Bilateral calves soft and nontender. Neurovascular status intact distally.    Labs:  Recent Labs   Lab 20  0757 20  0736 20  1034   HGB 13.2* 13.6 14.0   WBC 5.4 9.3 9.9       Assessment: successful mechanical thrombectomy of the pulmonary arteries for saddle pulmonary embolism on 3/4/20.    Plan: Anticoagulation per vascular medicine, planning to discharge on Eliquis. Suture removed from right groin site today. Expected discharge later today. Follow up as outpatient with Dr Lugo for recheck and repeat right lower extremity ultrasound in 1 month (we will contact patient to schedule).    20 minutes were spent with patient during  today's visit with greater than 50% of the time spent face to face with the patient, in reviewing medical record and images and in counseling and coordinating patient's care.    Elisha Fuentes PA-C  Interventional Radiology

## 2020-03-06 NOTE — PROGRESS NOTES
Cross-Cover Note  Called regarding IMC status. Patient has been stable today on RA without any evidence of hemodynamic instability. May come off IMC status. Continue telemetry.

## 2020-03-06 NOTE — CONSULTS
Medication coverage check for Eliquis or Xarelto. $0 copay monthly for either med.    Swathi Romero CphT  [facility] Discharge Pharmacy Liaison  Liaison Cell: [phone]

## 2020-05-12 ENCOUNTER — TELEPHONE (OUTPATIENT)
Dept: OTHER | Facility: CLINIC | Age: 60
End: 2020-05-12

## 2020-05-12 NOTE — TELEPHONE ENCOUNTER
S/p pulmonary angiogram with mechanical thrombectomy done on 3/4/2020 with Dr. Reese.  Left message on VM.  Madonna Duran RN  IR nurse clinician  [phone]

## 2020-06-01 ENCOUNTER — TELEPHONE (OUTPATIENT)
Dept: OTHER | Facility: CLINIC | Age: 60
End: 2020-06-01

## 2020-06-01 NOTE — TELEPHONE ENCOUNTER
Left message on VM.  Instructed to return phone call.  Madonna Duran RN  IR nurse clinician  [phone]

## 2020-06-24 ENCOUNTER — TELEPHONE (OUTPATIENT)
Dept: OTHER | Facility: CLINIC | Age: 60
End: 2020-06-24

## 2020-06-24 NOTE — TELEPHONE ENCOUNTER
Patient left message on VM.  He is receiving his care with another system.  He does not wish for follow up with Dr. Reese.  Madonna Duran RN  IR nurse clinician  [phone]